# Patient Record
Sex: FEMALE | Race: WHITE | NOT HISPANIC OR LATINO | Employment: OTHER | URBAN - METROPOLITAN AREA
[De-identification: names, ages, dates, MRNs, and addresses within clinical notes are randomized per-mention and may not be internally consistent; named-entity substitution may affect disease eponyms.]

---

## 2023-06-29 ENCOUNTER — HOSPITAL ENCOUNTER (OUTPATIENT)
Dept: DATA CONVERSION | Facility: HOSPITAL | Age: 63
End: 2023-06-29
Attending: INTERNAL MEDICINE | Admitting: INTERNAL MEDICINE
Payer: COMMERCIAL

## 2023-06-29 DIAGNOSIS — K76.6 PORTAL HYPERTENSION (MULTI): ICD-10-CM

## 2023-06-29 DIAGNOSIS — K31.89 OTHER DISEASES OF STOMACH AND DUODENUM: ICD-10-CM

## 2023-06-29 DIAGNOSIS — K29.70 GASTRITIS, UNSPECIFIED, WITHOUT BLEEDING: ICD-10-CM

## 2023-06-29 DIAGNOSIS — K22.70 BARRETT'S ESOPHAGUS WITHOUT DYSPLASIA: ICD-10-CM

## 2023-06-29 DIAGNOSIS — K58.9 IRRITABLE BOWEL SYNDROME WITHOUT DIARRHEA: ICD-10-CM

## 2023-06-29 DIAGNOSIS — K21.9 GASTRO-ESOPHAGEAL REFLUX DISEASE WITHOUT ESOPHAGITIS: ICD-10-CM

## 2023-06-29 DIAGNOSIS — F41.9 ANXIETY DISORDER, UNSPECIFIED: ICD-10-CM

## 2023-06-29 DIAGNOSIS — Z88.0 ALLERGY STATUS TO PENICILLIN: ICD-10-CM

## 2023-06-29 DIAGNOSIS — K31.7 POLYP OF STOMACH AND DUODENUM: ICD-10-CM

## 2023-06-29 DIAGNOSIS — I10 ESSENTIAL (PRIMARY) HYPERTENSION: ICD-10-CM

## 2023-07-07 LAB
COMPLETE PATHOLOGY REPORT: NORMAL
CONVERTED CLINICAL DIAGNOSIS-HISTORY: NORMAL
CONVERTED FINAL DIAGNOSIS: NORMAL
CONVERTED FINAL REPORT PDF LINK TO COPY AND PASTE: NORMAL
CONVERTED GROSS DESCRIPTION: NORMAL

## 2023-07-18 ENCOUNTER — APPOINTMENT (OUTPATIENT)
Dept: PRIMARY CARE | Facility: CLINIC | Age: 63
End: 2023-07-18
Payer: COMMERCIAL

## 2023-09-06 PROBLEM — K22.70 BARRETT ESOPHAGUS: Status: ACTIVE | Noted: 2023-09-06

## 2023-09-06 PROBLEM — B96.89 BACTERIAL VAGINITIS: Status: ACTIVE | Noted: 2023-09-06

## 2023-09-06 PROBLEM — R73.01 IFG (IMPAIRED FASTING GLUCOSE): Status: ACTIVE | Noted: 2023-09-06

## 2023-09-06 PROBLEM — R13.10 DYSPHAGIA: Status: ACTIVE | Noted: 2023-09-06

## 2023-09-06 PROBLEM — N76.0 BACTERIAL VAGINITIS: Status: ACTIVE | Noted: 2023-09-06

## 2023-09-06 PROBLEM — M21.162 GENU VARUM, ACQUIRED, LEFT: Status: ACTIVE | Noted: 2023-09-06

## 2023-09-06 PROBLEM — I10 ESSENTIAL HYPERTENSION: Status: ACTIVE | Noted: 2023-09-06

## 2023-09-06 PROBLEM — R07.9 CHEST PAIN: Status: ACTIVE | Noted: 2023-09-06

## 2023-09-06 PROBLEM — K58.9 IRRITABLE BOWEL SYNDROME: Status: ACTIVE | Noted: 2023-09-06

## 2023-09-06 PROBLEM — K21.9 GASTROESOPHAGEAL REFLUX DISEASE: Status: ACTIVE | Noted: 2023-09-06

## 2023-09-06 RX ORDER — MULTIVITAMIN
TABLET ORAL
COMMUNITY

## 2023-09-06 RX ORDER — LOSARTAN POTASSIUM 100 MG/1
100 TABLET ORAL DAILY
COMMUNITY
End: 2024-01-18 | Stop reason: SDUPTHER

## 2023-09-06 RX ORDER — LORAZEPAM 0.5 MG/1
1 TABLET ORAL DAILY PRN
COMMUNITY
End: 2024-01-18 | Stop reason: SDUPTHER

## 2023-09-06 RX ORDER — HYDROCODONE BITARTRATE AND ACETAMINOPHEN 5; 325 MG/1; MG/1
TABLET ORAL
COMMUNITY
Start: 2021-05-06 | End: 2023-10-30 | Stop reason: ALTCHOICE

## 2023-09-06 RX ORDER — HYDROCHLOROTHIAZIDE 12.5 MG/1
1 TABLET ORAL DAILY PRN
COMMUNITY
Start: 2020-02-13 | End: 2023-11-06 | Stop reason: ALTCHOICE

## 2023-09-06 RX ORDER — DOXYCYCLINE HYCLATE 100 MG
TABLET ORAL
COMMUNITY
Start: 2023-05-27 | End: 2023-10-30 | Stop reason: ALTCHOICE

## 2023-09-06 RX ORDER — MELOXICAM 15 MG/1
1 TABLET ORAL DAILY PRN
COMMUNITY
Start: 2022-08-17 | End: 2024-04-03 | Stop reason: SDUPTHER

## 2023-09-06 RX ORDER — ONDANSETRON 4 MG/1
TABLET, FILM COATED ORAL
COMMUNITY
Start: 2021-05-06 | End: 2023-10-30 | Stop reason: WASHOUT

## 2023-09-06 RX ORDER — PANTOPRAZOLE SODIUM 40 MG/1
TABLET, DELAYED RELEASE ORAL
COMMUNITY
End: 2024-01-18 | Stop reason: SDUPTHER

## 2023-09-29 VITALS — BODY MASS INDEX: 26.57 KG/M2 | HEIGHT: 62 IN | WEIGHT: 144.4 LBS

## 2023-10-26 ENCOUNTER — OFFICE VISIT (OUTPATIENT)
Dept: ORTHOPEDIC SURGERY | Facility: HOSPITAL | Age: 63
End: 2023-10-26
Payer: COMMERCIAL

## 2023-10-26 DIAGNOSIS — M17.12 PRIMARY OSTEOARTHRITIS OF LEFT KNEE: Primary | ICD-10-CM

## 2023-10-26 PROCEDURE — 1036F TOBACCO NON-USER: CPT | Performed by: FAMILY MEDICINE

## 2023-10-26 PROCEDURE — 20611 DRAIN/INJ JOINT/BURSA W/US: CPT | Mod: LT | Performed by: FAMILY MEDICINE

## 2023-10-26 PROCEDURE — 2500000004 HC RX 250 GENERAL PHARMACY W/ HCPCS (ALT 636 FOR OP/ED): Mod: JZ | Performed by: FAMILY MEDICINE

## 2023-10-26 PROCEDURE — 2500000005 HC RX 250 GENERAL PHARMACY W/O HCPCS: Performed by: FAMILY MEDICINE

## 2023-10-26 RX ORDER — LIDOCAINE HYDROCHLORIDE 10 MG/ML
2 INJECTION INFILTRATION; PERINEURAL
Status: COMPLETED | OUTPATIENT
Start: 2023-10-26 | End: 2023-10-26

## 2023-10-26 RX ADMIN — LIDOCAINE HYDROCHLORIDE 2 ML: 10 INJECTION, SOLUTION INFILTRATION; PERINEURAL at 07:59

## 2023-10-26 RX ADMIN — Medication 48 MG: at 07:59

## 2023-10-26 ASSESSMENT — PAIN - FUNCTIONAL ASSESSMENT: PAIN_FUNCTIONAL_ASSESSMENT: 0-10

## 2023-10-26 ASSESSMENT — PAIN SCALES - GENERAL: PAINLEVEL_OUTOF10: 0 - NO PAIN

## 2023-10-26 NOTE — PROGRESS NOTES
** Please excuse any errors in grammar or translation related to this dictation. Voice recognition software was utilized to prepare this document. **    Assessment & Plan:  Synvisc-One injection completed in left knee as below. Discussed with patient that it can take 2-3 weeks for pain relief to occur. This injection can be repeated again in 6 months if providing symptomatic relief. Patient asked to contact clinic in 5 months to start prior authorization process. If has increasing pain prior to then that is not controlled by oral analgesics, can f/u to have CSI completed.  All questions answered and patient is agreeable to this plan.     Chief complaint: left knee pain, Synvisc-One    HPI:  10/26/23: Patient presents for left knee Synvisc-One injection.  Reports she responded well to steroid injection is having minimal symptoms at this time.      8/31/23: 63-year-old female history of left knee arthritis referred by Dr. Kamara for hyaluronic acid injection consult.  Patient reports ongoing left knee pain for a little over 1 year. She reports previous treatment with steroid injection followed by hyaluronic acid injection. The Synvisc-1 injection was completed in October 2022. Reports this gave her approximately 5 to 6 months of pain relief. She recently follow-up with Dr. Kamara to discuss ongoing treatments of this condition. She was informed that due to the underlying arthritis in her knee that she would not be a candidate for arthroscopic surgery. If wanted to pursue surgical options it would be for total joint replacement. She feels that her symptoms are not severe and like to continue nonoperative management at this time.     Exam:  Left knee examined. No effusion, ecchymosis, or erythema. AROM from 0 to 130 deg with 5/5 strength.    Procedure:  Patient ID: Angie Hill is a 63 y.o. female.    L Inj/Asp: L knee on 10/26/2023 7:59 AM  Indications: pain  Details: 21 G needle, ultrasound-guided  Medications: 48  mg hylan 48 mg/6 mL; 2 mL lidocaine 10 mg/mL (1 %)  Outcome: tolerated well, no immediate complications  Procedure, treatment alternatives, risks and benefits explained, specific risks discussed. Consent was given by the patient. Immediately prior to procedure a time out was called to verify the correct patient, procedure, equipment, support staff and site/side marked as required. Patient was prepped and draped in the usual sterile fashion.

## 2023-10-30 ENCOUNTER — OFFICE VISIT (OUTPATIENT)
Dept: PRIMARY CARE | Facility: CLINIC | Age: 63
End: 2023-10-30
Payer: COMMERCIAL

## 2023-10-30 VITALS
HEIGHT: 62 IN | DIASTOLIC BLOOD PRESSURE: 88 MMHG | HEART RATE: 67 BPM | BODY MASS INDEX: 29.22 KG/M2 | SYSTOLIC BLOOD PRESSURE: 122 MMHG | WEIGHT: 158.8 LBS | TEMPERATURE: 96.8 F | OXYGEN SATURATION: 99 %

## 2023-10-30 DIAGNOSIS — I10 ESSENTIAL HYPERTENSION: Primary | ICD-10-CM

## 2023-10-30 DIAGNOSIS — R10.9 LEFT FLANK PAIN: ICD-10-CM

## 2023-10-30 PROCEDURE — 99214 OFFICE O/P EST MOD 30 MIN: CPT | Performed by: FAMILY MEDICINE

## 2023-10-30 PROCEDURE — 1036F TOBACCO NON-USER: CPT | Performed by: FAMILY MEDICINE

## 2023-10-30 PROCEDURE — 3074F SYST BP LT 130 MM HG: CPT | Performed by: FAMILY MEDICINE

## 2023-10-30 PROCEDURE — 3079F DIAST BP 80-89 MM HG: CPT | Performed by: FAMILY MEDICINE

## 2023-10-30 RX ORDER — AMLODIPINE BESYLATE 5 MG/1
5 TABLET ORAL DAILY
Qty: 30 TABLET | Refills: 1 | Status: SHIPPED | OUTPATIENT
Start: 2023-10-30 | End: 2023-11-28 | Stop reason: SDUPTHER

## 2023-10-30 ASSESSMENT — PAIN SCALES - GENERAL: PAINLEVEL: 2

## 2023-10-30 NOTE — ASSESSMENT & PLAN NOTE
- Blood pressure stable in office today though with recent fluctuating blood pressures we will try alternative amlodipine in place of hydrochlorothiazide with plans to monitor ambulatory pressures and contact office in 2 to 3 weeks with readings  -If pressures continue to fluctuate above goal of 140/90 despite use of hydrochlorothiazide and amlodipine, can potentially trial alternative to losartan

## 2023-10-30 NOTE — PATIENT INSTRUCTIONS
Problem List Items Addressed This Visit             ICD-10-CM    Essential hypertension - Primary I10     - Blood pressure stable in office today though with recent fluctuating blood pressures we will try alternative amlodipine in place of hydrochlorothiazide with plans to monitor ambulatory pressures and contact office in 2 to 3 weeks with readings  -If pressures continue to fluctuate above goal of 140/90 despite use of hydrochlorothiazide and amlodipine, can potentially trial alternative to losartan         Relevant Medications    amLODIPine (Norvasc) 5 mg tablet    Other Relevant Orders    CBC    Comprehensive metabolic panel    Lipid panel    Tsh With Reflex To Free T4 If Abnormal    Left flank pain R10.9     - Flank pain complaints seem consistent with probable musculoskeletal strain though we will check a urinalysis along with blood work to assess for any other abnormalities such as kidney dysfunction  -If symptoms persist, can coordinate further evaluation with imaging and/or physical therapy assessment         Relevant Orders    CBC    Comprehensive metabolic panel    Urinalysis with Reflex Microscopic

## 2023-10-30 NOTE — ASSESSMENT & PLAN NOTE
- Flank pain complaints seem consistent with probable musculoskeletal strain though we will check a urinalysis along with blood work to assess for any other abnormalities such as kidney dysfunction  -If symptoms persist, can coordinate further evaluation with imaging and/or physical therapy assessment

## 2023-10-30 NOTE — PROGRESS NOTES
Outpatient Visit Note    Chief Complaint   Patient presents with    Hypertension     Pt states she does not feel hydrochlorothiazide is helping. Pt is having numbers such as 150/100 and 140/84. Pt states diastolic has not gone below 84 since she started the medication.      Pt took BP at home around 8am which was 134/93. Pt states that after about 4 hours after taking initial BP is the best but then goes back up for the rest of the day.         HPI:  Angie Hill is a 63 y.o. female a past medical history significant for hypertension, GERD, anxiety and bilateral knee pain who presents to the office secondary to concerns of elevated blood pressure.  She was last seen in the office on 7/13/2023 as a new patient to establish care and for blood pressure follow-up.    Had been previously established with primary care providers at [facility], in which she was in the process of moving to the area at last encounter.  Had been last seen by her former PCP for routine follow-up in February at which time blood work was completed including CBC, CMP lipid panel. Blood work was remarkable for hyperglycemia and mildly elevated LDL. Beyond her primary care provider, she does have established relationship with GI to whom she last saw in May for repeat endoscopy. States the GERD has been stable on pantoprazole. She also has seen orthopedics for her knee pain.    Hypertension:  Has historically manage her high blood pressure with losartan 100 mg daily which has been well-tolerated without adverse side effects.  At last encounter she noted that her blood pressure has recently been elevated over the prior several days with systolics in the 170s and diastolics in the 100s. Denied any chest pain, shortness a breath, lightheadedness or dizziness.  Admitted to recent stressors related to moving. Had been previously prescribed hydrochlorothiazide 12.5 mg, though she stated that this was never routinely, using it only as  needed.  Blood pressure was elevated in office at 144/90 to which she was encouraged to routinely take hydrochlorothiazide along with losartan regimen.     Today she reports compliance with losartan hydrochlorothiazide regimen though she continues to note alternating pressures.  States that her pressures in the morning are typically in the 140s/90s-100s.  After she takes her medication her pressures normally stabilize in the middle of the day though this wears off in the evenings.  Denies any adverse side effects from hydrochlorothiazide.  No reported chest pain, shortness of breath, lightheadedness or dizziness.    In regards to her stress level, previously expressed to have been under significant stress following her recent move. Traditionally managed her anxiety with infrequent as needed lorazepam.  Reports ongoing stressors with her home set to be completed in November. Denies recent anxiety attacks or other symptomatic complaints such as major depressive episodes, anhedonia, SI/HI.    Lastly, she has had persistent left flank pain for the last several months.  Does have a history of sciatica to which she has been actively working with chiropractic medicine.  Was able to help with low back/sciatic issues though flank discomfort has continued.  Denies any urinary changes, hematuria, urgency or frequency.  Current Medications  Current Outpatient Medications   Medication Instructions    hydroCHLOROthiazide (HYDRODiuril) 12.5 mg tablet 1 tablet, oral, Daily PRN    LORazepam (Ativan) 0.5 mg tablet 1 tablet, oral, Daily PRN    losartan (COZAAR) 100 mg, oral, Daily    meloxicam (Mobic) 15 mg tablet 1 tablet, oral, Daily PRN    multivitamin tablet TAKE 1 TABLET DAILY.           OTC    pantoprazole (ProtoNix) 40 mg EC tablet Take one tablet once daily 30minutes - 1 hr prior to meal.        Allergies  Allergies   Allergen Reactions    Tetracyclines GI Upset    Penicillins Rash        Past Medical History:   Diagnosis Date     Anxiety     GERD (gastroesophageal reflux disease)     Hypertension     Personal history of other mental and behavioral disorders 2022    History of anxiety      Past Surgical History:   Procedure Laterality Date     SECTION, CLASSIC  2016     Section    LAPAROSCOPY DIAGNOSTIC / BIOPSY / ASPIRATION / LYSIS  2016    Exploratory Laparoscopy    OTHER SURGICAL HISTORY  10/12/2021    Knee surgery     Family History   Problem Relation Name Age of Onset    Heart disease Mother      Hypertension Mother      Lung disease Father      Ovarian cancer Sister       Social History     Tobacco Use    Smoking status: Never    Smokeless tobacco: Never   Vaping Use    Vaping Use: Never used   Substance Use Topics    Alcohol use: Never    Drug use: Never       ROS  All pertinent positive symptoms are included in the history of present illness.  All other systems have been reviewed and are negative and noncontributory to this patient's current ailments.    VITAL SIGNS  Vitals:    10/30/23 1126   BP: 122/88   Pulse: 67   Temp: 36 °C (96.8 °F)   SpO2: 99%       PHYSICAL EXAM  GENERAL APPEARANCE: alert and oriented, Pleasant and cooperative, No Acute Distress  HEENT: EOMI, PERRLA, MMM  HEART: RRR, normal S1S2, no murmurs, click or rubs  LUNGS: clear to auscultation bilaterally, no wheezes/rhonchi/rales  EXTREMITIES: no edema, normal ROM  SKIN: normal, no rash, unremarkable  NEUROLOGIC EXAM: non-focal exam  MUSCULOSKELETAL: no gross abnormalities  PSYCH: affect is normal, eye contact is good    Assessment/Plan   Problem List Items Addressed This Visit             ICD-10-CM    Essential hypertension - Primary I10     - Blood pressure stable in office today though with recent fluctuating blood pressures we will try alternative amlodipine in place of hydrochlorothiazide with plans to monitor ambulatory pressures and contact office in 2 to 3 weeks with readings  -If pressures continue to fluctuate above goal  of 140/90 despite use of hydrochlorothiazide and amlodipine, can potentially trial alternative to losartan         Relevant Medications    amLODIPine (Norvasc) 5 mg tablet    Other Relevant Orders    CBC    Comprehensive metabolic panel    Lipid panel    Tsh With Reflex To Free T4 If Abnormal    Left flank pain R10.9     - Flank pain complaints seem consistent with probable musculoskeletal strain though we will check a urinalysis along with blood work to assess for any other abnormalities such as kidney dysfunction  -If symptoms persist, can coordinate further evaluation with imaging and/or physical therapy assessment         Relevant Orders    CBC    Comprehensive metabolic panel    Urinalysis with Reflex Microscopic

## 2023-11-06 ENCOUNTER — LAB (OUTPATIENT)
Dept: LAB | Facility: LAB | Age: 63
End: 2023-11-06
Payer: COMMERCIAL

## 2023-11-06 ENCOUNTER — OFFICE VISIT (OUTPATIENT)
Dept: OBSTETRICS AND GYNECOLOGY | Facility: CLINIC | Age: 63
End: 2023-11-06
Payer: COMMERCIAL

## 2023-11-06 VITALS — SYSTOLIC BLOOD PRESSURE: 124 MMHG | DIASTOLIC BLOOD PRESSURE: 90 MMHG

## 2023-11-06 DIAGNOSIS — Z11.51 SCREENING FOR HUMAN PAPILLOMAVIRUS (HPV): ICD-10-CM

## 2023-11-06 DIAGNOSIS — Z12.4 SCREENING FOR CERVICAL CANCER: ICD-10-CM

## 2023-11-06 DIAGNOSIS — Z12.31 SCREENING MAMMOGRAM FOR BREAST CANCER: ICD-10-CM

## 2023-11-06 DIAGNOSIS — Z01.419 ENCOUNTER FOR WELL WOMAN EXAM WITH ROUTINE GYNECOLOGICAL EXAM: ICD-10-CM

## 2023-11-06 DIAGNOSIS — Z01.419 ENCOUNTER FOR WELL WOMAN EXAM WITH ROUTINE GYNECOLOGICAL EXAM: Primary | ICD-10-CM

## 2023-11-06 PROCEDURE — 88175 CYTOPATH C/V AUTO FLUID REDO: CPT

## 2023-11-06 PROCEDURE — 3080F DIAST BP >= 90 MM HG: CPT | Performed by: NURSE PRACTITIONER

## 2023-11-06 PROCEDURE — 99396 PREV VISIT EST AGE 40-64: CPT | Performed by: NURSE PRACTITIONER

## 2023-11-06 PROCEDURE — 87624 HPV HI-RISK TYP POOLED RSLT: CPT

## 2023-11-06 PROCEDURE — 1036F TOBACCO NON-USER: CPT | Performed by: NURSE PRACTITIONER

## 2023-11-06 PROCEDURE — 86304 IMMUNOASSAY TUMOR CA 125: CPT

## 2023-11-06 PROCEDURE — 36415 COLL VENOUS BLD VENIPUNCTURE: CPT

## 2023-11-06 PROCEDURE — 3074F SYST BP LT 130 MM HG: CPT | Performed by: NURSE PRACTITIONER

## 2023-11-06 NOTE — PROGRESS NOTES
HPI:   Angie Hill is a 63 y.o. who presents today for her annual gynecologic exam without complaints    She has the following concerns; None.     GYN HISTORY:  Denies any postmenopausal bleeding.     PAP History   Last pap:   2019 Normal HPV Negative  History of abnormal pap: no    Health Screening  Family history of breast, uterine, ovarian or colon cancer: yes - her sister has a hx of ovarian cancer.     Breast cancer: mammogram - needs an order. Desires breast MRI; hx of dense breasts.   Last mammogram: 2023    Colon cancer: per PCP        The patient feels safe at home.         Review of Systems:   Constitutional: no fever and no chills.  Cardiovascular: no chest pain.   Respiratory: no shortness of breath.   Gastrointestinal: no nausea, no abdominal pain and no constipation  Genitourinary: no dysuria, no urinary incontinence, no vaginal dryness, no pelvic pain and no vaginal discharge.   Neurological: no headache.  Psychiatric: no anxiety and no depression.              Objective         /90         Physical Exam:   Constitutional: Alert and in no acute distress. Well developed, well nourished.      Neck: No neck asymmetry. Supple. Thyroid not enlarged and there were no palpable thyroid nodules.      Cardiovascular: Heart rate and rhythm were normal, normal S1 and S2, no gallops, and no murmurs.      Pulmonary: No respiratory distress. Clear bilateral breath sounds.      Chest: Breasts: Normal appearance, no nipple discharge and no skin changes. Palpation of breasts and axillae: No palpable mass and no axillary lymphadenopathy.      Abdomen: Soft nontender; no abdominal mass palpated. Normal bowel sounds. No organomegaly.      Genitourinary:   - External genitalia: Normal.   - Palpation of lymph nodes in groin: No inguinal lymphadenopathy.   - Bartholin's Urethral and Skenes Glands: Normal.   - Urethra: Normal.    -Bladder: Normal on palpation.   - Vagina: Normal.   - Cervix: Normal.   -  Uterus: Normal. Right Adnexa/parametria: Normal. Left Adnexa/parametria: Normal.   - Perianal Area: Normal.      Skin: Normal skin color and pigmentation, normal skin turgor, and no rash     Psychiatric: Alert and oriented x 3. Affect normal to patient baseline. Mood: Appropriate.            Assessment/Plan       Diagnoses and all orders for this visit:  Encounter for well woman exam with routine gynecological exam  Here for well woman exam. She is doing well. 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 She desires Ca-125 to be drawn today. She denies any epigastric pain, no postmenopausal bleeding or abnormal vaginal discharge.   -     Cancer Antigen 125; Future  Screening mammogram for breast cancer  -     BI mammo bilateral screening tomosynthesis; Future  -     MR breast bilateral w IV contrast fast screening self pay; Future  Screening for cervical cancer  -     THINPREP PAP  Screening for human papillomavirus (HPV)  -     THINPREP PAP    Follow-up in one year; sooner if needed. Will plan for possible pelvic ultrasound next year and bone density scan starting at age 64.         KEYUR William-CNP

## 2023-11-07 LAB — CANCER AG125 SERPL-ACNC: 13.1 U/ML (ref 0–30.2)

## 2023-11-28 ENCOUNTER — OFFICE VISIT (OUTPATIENT)
Dept: PRIMARY CARE | Facility: CLINIC | Age: 63
End: 2023-11-28
Payer: COMMERCIAL

## 2023-11-28 VITALS
SYSTOLIC BLOOD PRESSURE: 140 MMHG | DIASTOLIC BLOOD PRESSURE: 80 MMHG | HEIGHT: 62 IN | TEMPERATURE: 96.6 F | HEART RATE: 82 BPM | BODY MASS INDEX: 29.52 KG/M2 | OXYGEN SATURATION: 97 % | WEIGHT: 160.4 LBS

## 2023-11-28 DIAGNOSIS — I10 ESSENTIAL HYPERTENSION: ICD-10-CM

## 2023-11-28 DIAGNOSIS — J01.40 ACUTE NON-RECURRENT PANSINUSITIS: Primary | ICD-10-CM

## 2023-11-28 PROCEDURE — 3077F SYST BP >= 140 MM HG: CPT | Performed by: FAMILY MEDICINE

## 2023-11-28 PROCEDURE — 1036F TOBACCO NON-USER: CPT | Performed by: FAMILY MEDICINE

## 2023-11-28 PROCEDURE — 99214 OFFICE O/P EST MOD 30 MIN: CPT | Performed by: FAMILY MEDICINE

## 2023-11-28 PROCEDURE — 3079F DIAST BP 80-89 MM HG: CPT | Performed by: FAMILY MEDICINE

## 2023-11-28 RX ORDER — AZITHROMYCIN 250 MG/1
TABLET, FILM COATED ORAL
Qty: 6 TABLET | Refills: 0 | Status: SHIPPED | OUTPATIENT
Start: 2023-11-28 | End: 2023-12-03

## 2023-11-28 RX ORDER — AMLODIPINE BESYLATE 5 MG/1
5 TABLET ORAL DAILY
Qty: 30 TABLET | Refills: 0 | Status: SHIPPED | OUTPATIENT
Start: 2023-11-28 | End: 2024-01-18 | Stop reason: SDUPTHER

## 2023-11-28 RX ORDER — FLUTICASONE PROPIONATE 50 MCG
1 SPRAY, SUSPENSION (ML) NASAL DAILY
Qty: 16 G | Refills: 1 | Status: SHIPPED | OUTPATIENT
Start: 2023-11-28 | End: 2024-01-18 | Stop reason: ALTCHOICE

## 2023-11-28 ASSESSMENT — PATIENT HEALTH QUESTIONNAIRE - PHQ9
SUM OF ALL RESPONSES TO PHQ9 QUESTIONS 1 & 2: 0
1. LITTLE INTEREST OR PLEASURE IN DOING THINGS: NOT AT ALL
2. FEELING DOWN, DEPRESSED OR HOPELESS: NOT AT ALL

## 2023-11-28 ASSESSMENT — LIFESTYLE VARIABLES
AUDIT-C TOTAL SCORE: 1
HOW OFTEN DO YOU HAVE A DRINK CONTAINING ALCOHOL: MONTHLY OR LESS
HOW MANY STANDARD DRINKS CONTAINING ALCOHOL DO YOU HAVE ON A TYPICAL DAY: PATIENT DOES NOT DRINK
HOW OFTEN DO YOU HAVE SIX OR MORE DRINKS ON ONE OCCASION: NEVER
SKIP TO QUESTIONS 9-10: 1

## 2023-11-28 ASSESSMENT — ENCOUNTER SYMPTOMS
OCCASIONAL FEELINGS OF UNSTEADINESS: 0
DEPRESSION: 0
LOSS OF SENSATION IN FEET: 0

## 2023-11-28 ASSESSMENT — PAIN SCALES - GENERAL: PAINLEVEL: 8

## 2023-11-28 NOTE — ASSESSMENT & PLAN NOTE
- Given your symptoms and duration of illness, we feel that you can benefit from antibiotic coverage at this time   - A prescription for azithromycin was sent to your pharmacy, please take this medication as prescribed  - Flonase additionally encouraged secondary to sinus pressure complaints   - Recommend supportive care with increased fluid intake to thin secretions, Ibuprofen or Tylenol as needed for pain or fever, and steamy showers/saline nasal rinses to help clear the nasal passages   - You may consider tea with honey or a cinnamon stick as these have natural antiviral and antibiotic properties   - Call if symptoms worsen or do not improve with these treatments

## 2023-11-28 NOTE — ASSESSMENT & PLAN NOTE
- Blood pressures have been steadily improving on current amlodipine regimen though pressure slightly elevated today, likely secondary to acute infection  -We will continue on current regimen with follow-up plans in January as scheduled; prescription refill sent to cover until follow-up

## 2023-11-28 NOTE — PATIENT INSTRUCTIONS
Problem List Items Addressed This Visit             ICD-10-CM    Essential hypertension I10     - Blood pressures have been steadily improving on current amlodipine regimen though pressure slightly elevated today, likely secondary to acute infection  -We will continue on current regimen with follow-up plans in January as scheduled; prescription refill sent to cover until follow-up         Relevant Medications    amLODIPine (Norvasc) 5 mg tablet    Acute non-recurrent pansinusitis - Primary J01.40     - Given your symptoms and duration of illness, we feel that you can benefit from antibiotic coverage at this time   - A prescription for azithromycin was sent to your pharmacy, please take this medication as prescribed  - Flonase additionally encouraged secondary to sinus pressure complaints   - Recommend supportive care with increased fluid intake to thin secretions, Ibuprofen or Tylenol as needed for pain or fever, and steamy showers/saline nasal rinses to help clear the nasal passages   - You may consider tea with honey or a cinnamon stick as these have natural antiviral and antibiotic properties   - Call if symptoms worsen or do not improve with these treatments         Relevant Medications    azithromycin (Zithromax) 250 mg tablet    fluticasone (Flonase) 50 mcg/actuation nasal spray

## 2023-11-28 NOTE — PROGRESS NOTES
Outpatient Visit Note    Chief Complaint   Patient presents with    Sinusitis     Sinus infection/pressure x9 days. Took OTC COVID test with neg result. Brown colored drainage     Laryngitis    Earache     Having trouble hearing    Cough    Headache         HPI:  Angie Hill is a 63 y.o. female with a past medical history significant for hypertension, GERD, anxiety and bilateral knee pain who presents to the office secondary to multiple upper respiratory complaints including sinus pressure, laryngitis and ear pain.  She was last seen in the office on 10/30/2023 secondary to concerns of elevated blood pressure.  At time of last appointment, plan was set for patient to try amlodipine in place of hydrochlorothiazide secondary to limited response with fluctuating blood pressures.    She reports approximately 9 days of persistent and progressive sinus pressure with associated postnasal drainage, cough and headache.  Was attempting to manage symptoms with supportive care measures including Tylenol, DayQuil and saline nasal spray without significant improvement.  Developed moderate hoarseness over the last 48 hours with bilateral ear fullness/muffled hearing in the last 24 hours.  Has noted low-grade fevers though did take Tylenol today with normal vitals.    Hypertension:  Has been faithful with losartan and amlodipine regimen since last visit denying any adverse side effects.  After taking medication for 1 week, she did note improvement in her blood pressures with pressures typically 125-135 systolic and mid 80s diastolic.    Current Medications  Current Outpatient Medications   Medication Instructions    amLODIPine (NORVASC) 5 mg, oral, Daily    azithromycin (Zithromax) 250 mg tablet Take 2 tablets (500 mg) by mouth once daily for 1 day, THEN 1 tablet (250 mg) once daily for 4 days. Take 2 tabs (500 mg) by mouth today, than 1 daily for 4 days..    fluticasone (Flonase) 50 mcg/actuation nasal spray 1 spray,  Each Nostril, Daily, Shake gently. Before first use, prime pump. After use, clean tip and replace cap.    LORazepam (Ativan) 0.5 mg tablet 1 tablet, oral, Daily PRN    losartan (COZAAR) 100 mg, oral, Daily    meloxicam (Mobic) 15 mg tablet 1 tablet, oral, Daily PRN    multivitamin tablet TAKE 1 TABLET DAILY.           OTC    pantoprazole (ProtoNix) 40 mg EC tablet Take one tablet once daily 30minutes - 1 hr prior to meal.        Allergies  Allergies   Allergen Reactions    Tetracyclines GI Upset    Penicillins Rash        Past Medical History:   Diagnosis Date    Anxiety     GERD (gastroesophageal reflux disease)     Hypertension     Personal history of other mental and behavioral disorders 2022    History of anxiety      Past Surgical History:   Procedure Laterality Date     SECTION, CLASSIC  2016     Section    LAPAROSCOPY DIAGNOSTIC / BIOPSY / ASPIRATION / LYSIS  2016    Exploratory Laparoscopy    OTHER SURGICAL HISTORY  10/12/2021    Knee surgery     Family History   Problem Relation Name Age of Onset    Heart disease Mother      Hypertension Mother      Lung disease Father      Ovarian cancer Sister       Social History     Tobacco Use    Smoking status: Never    Smokeless tobacco: Never   Vaping Use    Vaping Use: Never used   Substance Use Topics    Alcohol use: Never    Drug use: Never       ROS  All pertinent positive symptoms are included in the history of present illness.  All other systems have been reviewed and are negative and noncontributory to this patient's current ailments.    VITAL SIGNS  Vitals:    23 1633   BP: 140/80   Pulse: 82   Temp: 35.9 °C (96.6 °F)   SpO2: 97%       PHYSICAL EXAM  GENERAL APPEARANCE: alert and oriented, Pleasant and cooperative, No Acute Distress  HEENT: EOMI, PERRLA, tympanic membranes retracted bilaterally with visible fluid, nose clear, Oropharynx mildly erythematous, MMM  NECK: Slight bilateral anterior cervical  lymphadenopathy  HEART: RRR, normal S1S2, no murmurs, click or rubs  LUNGS: clear to auscultation bilaterally, no wheezes/rhonchi/rales  EXTREMITIES: no edema, normal ROM  SKIN: normal, no rash, unremarkable  NEUROLOGIC EXAM: non-focal exam  MUSCULOSKELETAL: no gross abnormalities  PSYCH: affect is normal, eye contact is good      Assessment/Plan   Problem List Items Addressed This Visit             ICD-10-CM    Essential hypertension I10     - Blood pressures have been steadily improving on current amlodipine regimen though pressure slightly elevated today, likely secondary to acute infection  -We will continue on current regimen with follow-up plans in January as scheduled; prescription refill sent to cover until follow-up         Relevant Medications    amLODIPine (Norvasc) 5 mg tablet    Acute non-recurrent pansinusitis - Primary J01.40     - Given your symptoms and duration of illness, we feel that you can benefit from antibiotic coverage at this time   - A prescription for azithromycin was sent to your pharmacy, please take this medication as prescribed  - Flonase additionally encouraged secondary to sinus pressure complaints   - Recommend supportive care with increased fluid intake to thin secretions, Ibuprofen or Tylenol as needed for pain or fever, and steamy showers/saline nasal rinses to help clear the nasal passages   - You may consider tea with honey or a cinnamon stick as these have natural antiviral and antibiotic properties   - Call if symptoms worsen or do not improve with these treatments         Relevant Medications    azithromycin (Zithromax) 250 mg tablet    fluticasone (Flonase) 50 mcg/actuation nasal spray

## 2023-12-20 ENCOUNTER — OFFICE VISIT (OUTPATIENT)
Dept: DERMATOLOGY | Facility: CLINIC | Age: 63
End: 2023-12-20
Payer: COMMERCIAL

## 2023-12-20 DIAGNOSIS — Z80.8 FAMILY HISTORY OF SKIN CANCER: ICD-10-CM

## 2023-12-20 DIAGNOSIS — D18.01 HEMANGIOMA OF SKIN: ICD-10-CM

## 2023-12-20 DIAGNOSIS — L57.8 PHOTOAGING OF SKIN: ICD-10-CM

## 2023-12-20 DIAGNOSIS — L81.4 LENTIGO: ICD-10-CM

## 2023-12-20 DIAGNOSIS — Z12.83 ENCOUNTER FOR SCREENING FOR MALIGNANT NEOPLASM OF SKIN: Primary | ICD-10-CM

## 2023-12-20 DIAGNOSIS — L57.0 ACTINIC KERATOSIS: ICD-10-CM

## 2023-12-20 DIAGNOSIS — D48.5 NEOPLASM OF UNCERTAIN BEHAVIOR OF SKIN: ICD-10-CM

## 2023-12-20 DIAGNOSIS — L85.3 XEROSIS CUTIS: ICD-10-CM

## 2023-12-20 PROCEDURE — 88305 TISSUE EXAM BY PATHOLOGIST: CPT | Performed by: DERMATOLOGY

## 2023-12-20 PROCEDURE — 17000 DESTRUCT PREMALG LESION: CPT | Performed by: DERMATOLOGY

## 2023-12-20 PROCEDURE — 11103 TANGNTL BX SKIN EA SEP/ADDL: CPT | Performed by: DERMATOLOGY

## 2023-12-20 PROCEDURE — 88305 TISSUE EXAM BY PATHOLOGIST: CPT | Mod: TC,DER | Performed by: DERMATOLOGY

## 2023-12-20 PROCEDURE — 11102 TANGNTL BX SKIN SINGLE LES: CPT | Performed by: DERMATOLOGY

## 2023-12-20 PROCEDURE — 88341 IMHCHEM/IMCYTCHM EA ADD ANTB: CPT | Performed by: DERMATOLOGY

## 2023-12-20 PROCEDURE — 17003 DESTRUCT PREMALG LES 2-14: CPT | Performed by: DERMATOLOGY

## 2023-12-20 PROCEDURE — 1036F TOBACCO NON-USER: CPT | Performed by: DERMATOLOGY

## 2023-12-20 PROCEDURE — 99203 OFFICE O/P NEW LOW 30 MIN: CPT | Performed by: DERMATOLOGY

## 2023-12-20 PROCEDURE — 88341 IMHCHEM/IMCYTCHM EA ADD ANTB: CPT | Mod: TC,DER | Performed by: DERMATOLOGY

## 2023-12-20 PROCEDURE — 88342 IMHCHEM/IMCYTCHM 1ST ANTB: CPT | Performed by: DERMATOLOGY

## 2023-12-20 ASSESSMENT — DERMATOLOGY PATIENT ASSESSMENT
ARE YOU TRYING TO GET PREGNANT: NO
ARE YOU AN ORGAN TRANSPLANT RECIPIENT: NO
HAVE YOU HAD OR DO YOU HAVE VASCULAR DISEASE: NO
HAVE YOU HAD OR DO YOU HAVE A STAPH INFECTION: NO
DO YOU USE SUNSCREEN: OCCASIONALLY
DO YOU USE A TANNING BED: NO
DO YOU HAVE IRREGULAR MENSTRUAL CYCLES: NO
ARE YOU ON BIRTH CONTROL: NO
DO YOU HAVE ANY NEW OR CHANGING LESIONS: YES

## 2023-12-20 ASSESSMENT — DERMATOLOGY QUALITY OF LIFE (QOL) ASSESSMENT
RATE HOW EMOTIONALLY BOTHERED YOU ARE BY YOUR SKIN PROBLEM (FOR EXAMPLE, WORRY, EMBARRASSMENT, FRUSTRATION): 0 - NEVER BOTHERED
DATE THE QUALITY-OF-LIFE ASSESSMENT WAS COMPLETED: 66828
RATE HOW BOTHERED YOU ARE BY SYMPTOMS OF YOUR SKIN PROBLEM (EG, ITCHING, STINGING BURNING, HURTING OR SKIN IRRITATION): 0 - NEVER BOTHERED
ARE THERE EXCLUSIONS OR EXCEPTIONS FOR THE QUALITY OF LIFE ASSESSMENT: NO
WHAT SINGLE SKIN CONDITION LISTED BELOW IS THE PATIENT ANSWERING THE QUALITY-OF-LIFE ASSESSMENT QUESTIONS ABOUT: NONE OF THE ABOVE
RATE HOW BOTHERED YOU ARE BY EFFECTS OF YOUR SKIN PROBLEMS ON YOUR ACTIVITIES (EG, GOING OUT, ACCOMPLISHING WHAT YOU WANT, WORK ACTIVITIES OR YOUR RELATIONSHIPS WITH OTHERS): 0 - NEVER BOTHERED

## 2023-12-20 ASSESSMENT — ITCH NUMERIC RATING SCALE: HOW SEVERE IS YOUR ITCHING?: 0

## 2023-12-20 ASSESSMENT — PATIENT GLOBAL ASSESSMENT (PGA): PATIENT GLOBAL ASSESSMENT: PATIENT GLOBAL ASSESSMENT:  1 - CLEAR

## 2023-12-20 NOTE — PROGRESS NOTES
Subjective     Angie Hill is a 63 y.o. female who presents for the following: Skin Check (Family Hx of SCC - Parents. Areas of concern, Right Shoulder, Bilateral Arms. ).     Review of Systems:  No other skin or systemic complaints other than what is documented elsewhere in the note.    The following portions of the chart were reviewed this encounter and updated as appropriate:         Skin Cancer History  No skin cancer on file.      Specialty Problems          Dermatology Problems    Other nail disorders        Objective   Well appearing patient in no apparent distress; mood and affect are within normal limits.    A full examination was performed including scalp, head, eyes, ears, nose, lips, neck, chest, axillae, abdomen, back, buttocks, bilateral upper extremities, bilateral lower extremities, hands, feet, fingers, toes, fingernails, and toenails. All findings within normal limits unless otherwise noted below.    Assessment/Plan   1. Encounter for screening for malignant neoplasm of skin  No suspicious lesions noted on examination today    The risk of chronic, cumulative sun damage and risk of development of skin cancer was reviewed today.   The importance of sun protection was reviewed: including the use of a broad spectrum sunscreen that protects against both UVA/UVB rays, with ingredients such as Zinc oxide or titanium dioxide, wearing sun protective clothing and sun avoidance. We reviewed the warning signs of non-melanoma skin cancer and ABCDEs of melanoma  Please follow up should you notice any new or changing pre-existing skin lesion.    Related Procedures  Follow Up In Dermatology - Established Patient    2. Neoplasm of uncertain behavior of skin (2)  Right Upper Back  Erythematous scaly papule          Lesion biopsy  Type of biopsy: tangential    Informed consent: discussed and consent obtained    Timeout: patient name, date of birth, surgical site, and procedure verified    Procedure prep:   Patient was prepped and draped  Anesthesia: the lesion was anesthetized in a standard fashion    Anesthetic:  1% lidocaine w/ epinephrine 1-100,000 local infiltration  Instrument used: DermaBlade    Hemostasis achieved with: aluminum chloride    Outcome: patient tolerated procedure well    Post-procedure details: sterile dressing applied and wound care instructions given    Dressing type: petrolatum and bandage      Staff Communication: Dermatology Local Anesthesia: 1 % Lidocaine / Epinephrine - Amount:1cc    Specimen 1 - Dermatopathology- DERM LAB  Differential Diagnosis: BCC  Check Margins Yes/No?:    Comments:    Dermpath Lab: Routine Histopathology (formalin-fixed tissue)    Right superior shoulder  Brown asymmetric 4mm macule          Lesion biopsy  Type of biopsy: tangential    Informed consent: discussed and consent obtained    Timeout: patient name, date of birth, surgical site, and procedure verified    Procedure prep:  Patient was prepped and draped  Anesthesia: the lesion was anesthetized in a standard fashion    Anesthetic:  1% lidocaine w/ epinephrine 1-100,000 local infiltration  Instrument used: DermaBlade    Hemostasis achieved with: aluminum chloride    Outcome: patient tolerated procedure well    Post-procedure details: sterile dressing applied and wound care instructions given    Dressing type: petrolatum and bandage      Staff Communication: Dermatology Local Anesthesia: 1 % Lidocaine / Epinephrine - Amount:1cc    Specimen 2 - Dermatopathology- DERM LAB  Differential Diagnosis: atypical nevus vs. lentigo  Check Margins Yes/No?:    Comments:    Dermpath Lab: Routine Histopathology (formalin-fixed tissue)    Lesion concerning for bcc (1), atypical nevus (2). The need for biopsy for definitive diagnosis recommended. Risks and benefits reviewed, see procedure note.    3. Actinic keratosis (2)  Left Forearm - Posterior, Right Forearm - Posterior  Erythematous macules with gritty scale.    Lesions are  due to cumulative sun damage over time and are pre-cancerous. They have a risk (although small in majority of patients) of developing into squamous cell carcinoma and therefore treatment recommendations were offered and discussed.   Treatments Discussed include LN2, photodynamic (blue light) therapy & topical chemotherapy creams, risks and benefits of each.     The risks and benefits of LN2 were reviewed including incomplete removal, crusting, blister hypo and/or hyperpigmentation, scarring and recurrence. Pt elected for LN2 today    Destr of lesion - Left Forearm - Posterior, Right Forearm - Posterior  Complexity: simple    Destruction method: cryotherapy    Informed consent: discussed and consent obtained    Lesion destroyed using liquid nitrogen: Yes    Cryotherapy cycles:  1  Outcome: patient tolerated procedure well with no complications    Post-procedure details: wound care instructions given      4. Photoaging of skin  Mottled pigmentation with telangiectasias and brown reticular macules in sun exposed areas of the body.    The risk of chronic, cumulative sun damage and risk of development of skin cancer was reviewed today.   The importance of sun protection was reviewed: including the use of a broad spectrum sunscreen that protects against both UVA/UVB rays, with ingredients such as Zinc oxide or titanium dioxide, wearing sun protective clothing and sun avoidance. We reviewed the warning signs of non-melanoma skin cancer and ABCDEs of melanoma  Please follow up should you notice any new or changing pre-existing skin lesion.    Related Procedures  Follow Up In Dermatology - Established Patient    5. Family history of skin cancer  No suspicious lesions noted on examination today    The risk of chronic, cumulative sun damage and risk of development of skin cancer was reviewed today.   The importance of sun protection was reviewed: including the use of a broad spectrum sunscreen that protects against both  UVA/UVB rays, with ingredients such as Zinc oxide or titanium dioxide, wearing sun protective clothing and sun avoidance. We reviewed the warning signs of non-melanoma skin cancer and ABCDEs of melanoma  Please follow up should you notice any new or changing pre-existing skin lesion.    6. Xerosis cutis  Fine, ashy, pale to white scale diffusely over skin.    Dry skin is common, often worse during the dry months of the year and may also worsen as we age.  A gentle skin care regimen includes:  -washing with a mild soap without fragrance such as Dove for sensitive skin, Cetaphil or Cerave.  -pat dry after washing and then apply a thick moisturizer such as Cerave cream or Cetaphil cream. Creams are thicker than lotions and often do a better job of restoring the skin barrier.      7. Hemangioma of skin  Cherry red papules    The benign nature of these skin lesions were reviewed, no treatment is necessary.   Please follow up for any new or pre-existing lesion that is changing in size, shape, color, becomes painful, tender, itches or bleed.    8. Lentigo  Scattered tan macules in sun-exposed areas.    These are benign skin lesions due to sun exposure. They will darken in response to sun exposure. They should be monitored for change in size, shape or color.  These lesions can be treated cosmetically with topical creams, liquid nitrogen and a variety of lasers.      Follow up in 1 year or sooner pending biopsy results.

## 2023-12-26 LAB
LAB AP ASR DISCLAIMER: NORMAL
LABORATORY COMMENT REPORT: NORMAL
PATH REPORT.FINAL DX SPEC: NORMAL
PATH REPORT.GROSS SPEC: NORMAL
PATH REPORT.MICROSCOPIC SPEC OTHER STN: NORMAL
PATH REPORT.RELEVANT HX SPEC: NORMAL
PATH REPORT.TOTAL CANCER: NORMAL

## 2023-12-28 NOTE — RESULT ENCOUNTER NOTE
A) Superficial BCC - ED&C vs. Excision  B) Atypical compound melanocytic neoplasm concerning for melanoma 0.1mm Breslow - Excision with 1 cm margin    Left message to return call

## 2023-12-29 ENCOUNTER — TELEPHONE (OUTPATIENT)
Dept: DERMATOLOGY | Facility: CLINIC | Age: 63
End: 2023-12-29
Payer: COMMERCIAL

## 2023-12-29 DIAGNOSIS — D03.61 MELANOMA IN SITU OF RIGHT UPPER EXTREMITY (MULTI): Primary | ICD-10-CM

## 2023-12-29 DIAGNOSIS — C43.61 MALIGNANT MELANOMA OF RIGHT UPPER LIMB, INCLUDING SHOULDER (MULTI): ICD-10-CM

## 2023-12-29 NOTE — RESULT ENCOUNTER NOTE
Pt returned call and left message.  I returned call. Pt aware  A) BCC - Elected for ED&C  B) ATN concerning for melanoma - excision with 1cm margin  Both procedures can be done in 1 MSO slot - pt needs scheduled. I have sent PA

## 2024-01-04 ENCOUNTER — APPOINTMENT (OUTPATIENT)
Dept: RADIOLOGY | Facility: CLINIC | Age: 64
End: 2024-01-04
Payer: COMMERCIAL

## 2024-01-04 DIAGNOSIS — I10 ESSENTIAL (PRIMARY) HYPERTENSION: ICD-10-CM

## 2024-01-08 RX ORDER — HYDROCHLOROTHIAZIDE 12.5 MG/1
12.5 TABLET ORAL DAILY PRN
Qty: 90 TABLET | Refills: 1 | Status: SHIPPED | OUTPATIENT
Start: 2024-01-08 | End: 2024-01-18 | Stop reason: ALTCHOICE

## 2024-01-11 ENCOUNTER — LAB (OUTPATIENT)
Dept: LAB | Facility: LAB | Age: 64
End: 2024-01-11
Payer: COMMERCIAL

## 2024-01-11 DIAGNOSIS — R10.9 LEFT FLANK PAIN: ICD-10-CM

## 2024-01-11 DIAGNOSIS — I10 ESSENTIAL HYPERTENSION: ICD-10-CM

## 2024-01-11 LAB
ALBUMIN SERPL-MCNC: 4.7 G/DL (ref 3.5–5)
ALP BLD-CCNC: 105 U/L (ref 35–125)
ALT SERPL-CCNC: 19 U/L (ref 5–40)
AMORPH CRY #/AREA UR COMP ASSIST: ABNORMAL /HPF
ANION GAP SERPL CALC-SCNC: 14 MMOL/L
APPEARANCE UR: CLEAR
AST SERPL-CCNC: 19 U/L (ref 5–40)
BACTERIA #/AREA URNS AUTO: ABNORMAL /HPF
BILIRUB SERPL-MCNC: 0.6 MG/DL (ref 0.1–1.2)
BILIRUB UR STRIP.AUTO-MCNC: NEGATIVE MG/DL
BUN SERPL-MCNC: 16 MG/DL (ref 8–25)
CALCIUM SERPL-MCNC: 9.9 MG/DL (ref 8.5–10.4)
CHLORIDE SERPL-SCNC: 104 MMOL/L (ref 97–107)
CHOLEST SERPL-MCNC: 212 MG/DL (ref 133–200)
CHOLEST/HDLC SERPL: 4.2 {RATIO}
CO2 SERPL-SCNC: 24 MMOL/L (ref 24–31)
COLOR UR: COLORLESS
CREAT SERPL-MCNC: 0.8 MG/DL (ref 0.4–1.6)
EGFRCR SERPLBLD CKD-EPI 2021: 82 ML/MIN/1.73M*2
ERYTHROCYTE [DISTWIDTH] IN BLOOD BY AUTOMATED COUNT: 13.8 % (ref 11.5–14.5)
GLUCOSE SERPL-MCNC: 109 MG/DL (ref 65–99)
GLUCOSE UR STRIP.AUTO-MCNC: NORMAL MG/DL
HCT VFR BLD AUTO: 43.8 % (ref 36–46)
HDLC SERPL-MCNC: 50 MG/DL
HGB BLD-MCNC: 14.2 G/DL (ref 12–16)
KETONES UR STRIP.AUTO-MCNC: NEGATIVE MG/DL
LDLC SERPL CALC-MCNC: 129 MG/DL (ref 65–130)
LEUKOCYTE ESTERASE UR QL STRIP.AUTO: ABNORMAL
MCH RBC QN AUTO: 30.1 PG (ref 26–34)
MCHC RBC AUTO-ENTMCNC: 32.4 G/DL (ref 32–36)
MCV RBC AUTO: 93 FL (ref 80–100)
MUCOUS THREADS #/AREA URNS AUTO: ABNORMAL /LPF
NITRITE UR QL STRIP.AUTO: NEGATIVE
NRBC BLD-RTO: 0 /100 WBCS (ref 0–0)
PH UR STRIP.AUTO: 5.5 [PH]
PLATELET # BLD AUTO: 201 X10*3/UL (ref 150–450)
POTASSIUM SERPL-SCNC: 4.3 MMOL/L (ref 3.4–5.1)
PROT SERPL-MCNC: 6.8 G/DL (ref 5.9–7.9)
PROT UR STRIP.AUTO-MCNC: NEGATIVE MG/DL
RBC # BLD AUTO: 4.72 X10*6/UL (ref 4–5.2)
RBC # UR STRIP.AUTO: NEGATIVE /UL
RBC #/AREA URNS AUTO: ABNORMAL /HPF
SODIUM SERPL-SCNC: 142 MMOL/L (ref 133–145)
SP GR UR STRIP.AUTO: 1.01
SQUAMOUS #/AREA URNS AUTO: ABNORMAL /HPF
TRIGL SERPL-MCNC: 167 MG/DL (ref 40–150)
TSH SERPL DL<=0.05 MIU/L-ACNC: 1.99 MIU/L (ref 0.27–4.2)
UROBILINOGEN UR STRIP.AUTO-MCNC: NORMAL MG/DL
WBC # BLD AUTO: 6 X10*3/UL (ref 4.4–11.3)
WBC #/AREA URNS AUTO: ABNORMAL /HPF

## 2024-01-11 PROCEDURE — 36415 COLL VENOUS BLD VENIPUNCTURE: CPT

## 2024-01-11 PROCEDURE — 85027 COMPLETE CBC AUTOMATED: CPT

## 2024-01-11 PROCEDURE — 80053 COMPREHEN METABOLIC PANEL: CPT

## 2024-01-11 PROCEDURE — 81001 URINALYSIS AUTO W/SCOPE: CPT

## 2024-01-11 PROCEDURE — 80061 LIPID PANEL: CPT

## 2024-01-11 PROCEDURE — 84443 ASSAY THYROID STIM HORMONE: CPT

## 2024-01-15 ENCOUNTER — APPOINTMENT (OUTPATIENT)
Dept: PRIMARY CARE | Facility: CLINIC | Age: 64
End: 2024-01-15
Payer: COMMERCIAL

## 2024-01-15 ENCOUNTER — TELEPHONE (OUTPATIENT)
Dept: PRIMARY CARE | Facility: CLINIC | Age: 64
End: 2024-01-15

## 2024-01-15 ENCOUNTER — LAB (OUTPATIENT)
Dept: LAB | Facility: LAB | Age: 64
End: 2024-01-15
Payer: COMMERCIAL

## 2024-01-15 DIAGNOSIS — R30.0 DYSURIA: ICD-10-CM

## 2024-01-15 DIAGNOSIS — R30.0 DYSURIA: Primary | ICD-10-CM

## 2024-01-15 NOTE — TELEPHONE ENCOUNTER
"Nurse originally called patient to reschedule appmt for tomorrow morning. Patient became very audibly upset screaming at nurse. Patient states her test results were resulted 1/12/24 and \"Why am I just now getting the results now. Dr. Tyler should've given me the results on the 12th. I could've given another sample on the 12th.\" Patient states \"I waited a year for this appointment.\" Patient demanded a urine culture order be put in so she can go to the lab to have this done. Nurse offered patient a 15 min appmt slot appointment Wednesday at 8:45 am with PCP for UTI symptoms as PCP is not in office tomorrow and there are no open appointments tomorrow. Also offered patient an appointment with NP. Patient declined this as well. I also offered patient to put a message in to see if a provider that is in office can put in a repeat urine order per results from Dr. Tyler. Patient refused Wednesday appmt stating that she was not going to come in for an appointment for that Wednesday and then reschedule appmt for her 6 month appointment and have to pay for both.  Patient continued yelling stating \"I could end up with pilonidal nephritis\"  Patient states \"I'll be taking this as high up as it can go. 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 I'm taking this to the highest level it can go\" Patient continued to scream at nurse x 15 min phone call. Apologized and then continued to scream at nurse on the phone. NP put culture order in for patient. I called patient back and left a voicemail to notify patient that order was placed and she can go to one of the  labs to have this done.         "

## 2024-01-16 ENCOUNTER — LAB (OUTPATIENT)
Dept: LAB | Facility: LAB | Age: 64
End: 2024-01-16
Payer: COMMERCIAL

## 2024-01-16 ENCOUNTER — APPOINTMENT (OUTPATIENT)
Dept: PRIMARY CARE | Facility: CLINIC | Age: 64
End: 2024-01-16
Payer: COMMERCIAL

## 2024-01-16 DIAGNOSIS — R30.0 DYSURIA: ICD-10-CM

## 2024-01-16 PROCEDURE — 87086 URINE CULTURE/COLONY COUNT: CPT

## 2024-01-16 NOTE — TELEPHONE ENCOUNTER
Notification from the lab received urine she dropped at [facility] last night was left out and was not viable for Culture. Order was cancelled. I called patient to notify her of this and apologized on behalf of the lab for this error. New order for culture was placed and patient is going to [place] now to complete. Patient seeing PCP Thursday for follow up and will review results at that time. Provided patient my direct line to call me once she gives sample and I will verify with lab it is sent to be run and stored properly to avoid future issues. Patient is grateful for the assistance and I again provided an apology on behalf of the lab for this error. Patient going now to lab. Order entered.

## 2024-01-17 ENCOUNTER — APPOINTMENT (OUTPATIENT)
Dept: PRIMARY CARE | Facility: CLINIC | Age: 64
End: 2024-01-17
Payer: COMMERCIAL

## 2024-01-17 LAB — BACTERIA UR CULT: NORMAL

## 2024-01-17 NOTE — TELEPHONE ENCOUNTER
Thank you so very much for sorting out this patient's urine culture.  Will review results and plan to discuss with patient at her upcoming appointment.  I have attempted to thoroughly review the chart in regards to where patient's initial orders came from as I cannot find correlating messages on why we were checking a urine sample in the first place.  I do see a diagnosis of left flank pain, though do not have any correlation to recent complaints.  Similarly, will plan to discuss if patient is actively having symptoms as we evaluate what her results are.

## 2024-01-18 ENCOUNTER — OFFICE VISIT (OUTPATIENT)
Dept: PRIMARY CARE | Facility: CLINIC | Age: 64
End: 2024-01-18
Payer: COMMERCIAL

## 2024-01-18 VITALS
HEART RATE: 69 BPM | OXYGEN SATURATION: 99 % | TEMPERATURE: 97.5 F | SYSTOLIC BLOOD PRESSURE: 130 MMHG | BODY MASS INDEX: 28.89 KG/M2 | WEIGHT: 157 LBS | HEIGHT: 62 IN | DIASTOLIC BLOOD PRESSURE: 86 MMHG

## 2024-01-18 DIAGNOSIS — F41.9 ANXIETY: ICD-10-CM

## 2024-01-18 DIAGNOSIS — K21.9 GASTROESOPHAGEAL REFLUX DISEASE, UNSPECIFIED WHETHER ESOPHAGITIS PRESENT: ICD-10-CM

## 2024-01-18 DIAGNOSIS — E78.2 MIXED HYPERLIPIDEMIA: ICD-10-CM

## 2024-01-18 DIAGNOSIS — K58.9 IRRITABLE BOWEL SYNDROME, UNSPECIFIED TYPE: ICD-10-CM

## 2024-01-18 DIAGNOSIS — R73.9 HYPERGLYCEMIA: ICD-10-CM

## 2024-01-18 DIAGNOSIS — R73.01 IFG (IMPAIRED FASTING GLUCOSE): ICD-10-CM

## 2024-01-18 DIAGNOSIS — I10 ESSENTIAL HYPERTENSION: Primary | ICD-10-CM

## 2024-01-18 DIAGNOSIS — I10 ESSENTIAL HYPERTENSION: ICD-10-CM

## 2024-01-18 DIAGNOSIS — R10.9 LEFT FLANK PAIN: ICD-10-CM

## 2024-01-18 DIAGNOSIS — K57.92 DIVERTICULITIS: ICD-10-CM

## 2024-01-18 PROCEDURE — 99214 OFFICE O/P EST MOD 30 MIN: CPT | Performed by: FAMILY MEDICINE

## 2024-01-18 PROCEDURE — 3079F DIAST BP 80-89 MM HG: CPT | Performed by: FAMILY MEDICINE

## 2024-01-18 PROCEDURE — 1036F TOBACCO NON-USER: CPT | Performed by: FAMILY MEDICINE

## 2024-01-18 PROCEDURE — 3075F SYST BP GE 130 - 139MM HG: CPT | Performed by: FAMILY MEDICINE

## 2024-01-18 RX ORDER — CIPROFLOXACIN 500 MG/1
500 TABLET ORAL 2 TIMES DAILY
Qty: 20 TABLET | Refills: 0 | Status: SHIPPED | OUTPATIENT
Start: 2024-01-18 | End: 2024-01-28

## 2024-01-18 RX ORDER — PANTOPRAZOLE SODIUM 40 MG/1
40 TABLET, DELAYED RELEASE ORAL
Qty: 90 TABLET | Refills: 1 | Status: SHIPPED | OUTPATIENT
Start: 2024-01-18 | End: 2024-07-16

## 2024-01-18 RX ORDER — METRONIDAZOLE 500 MG/1
500 TABLET ORAL 3 TIMES DAILY
Qty: 21 TABLET | Refills: 0 | Status: SHIPPED | OUTPATIENT
Start: 2024-01-18 | End: 2024-01-25

## 2024-01-18 RX ORDER — AMLODIPINE BESYLATE 5 MG/1
5 TABLET ORAL DAILY
Qty: 90 TABLET | Refills: 1 | Status: SHIPPED | OUTPATIENT
Start: 2024-01-18 | End: 2024-07-16

## 2024-01-18 RX ORDER — DICYCLOMINE HYDROCHLORIDE 10 MG/1
10 CAPSULE ORAL 3 TIMES DAILY
Qty: 90 CAPSULE | Refills: 0 | Status: SHIPPED | OUTPATIENT
Start: 2024-01-18 | End: 2024-02-17

## 2024-01-18 RX ORDER — LORAZEPAM 0.5 MG/1
0.5 TABLET ORAL EVERY 6 HOURS PRN
Qty: 28 TABLET | Refills: 0 | Status: SHIPPED | OUTPATIENT
Start: 2024-01-18 | End: 2024-01-25

## 2024-01-18 RX ORDER — LOSARTAN POTASSIUM 100 MG/1
100 TABLET ORAL DAILY
Qty: 90 TABLET | Refills: 1 | Status: SHIPPED | OUTPATIENT
Start: 2024-01-18 | End: 2024-07-16

## 2024-01-18 ASSESSMENT — PATIENT HEALTH QUESTIONNAIRE - PHQ9
SUM OF ALL RESPONSES TO PHQ9 QUESTIONS 1 AND 2: 0
1. LITTLE INTEREST OR PLEASURE IN DOING THINGS: NOT AT ALL
2. FEELING DOWN, DEPRESSED OR HOPELESS: NOT AT ALL

## 2024-01-18 ASSESSMENT — PAIN SCALES - GENERAL: PAINLEVEL: 6

## 2024-01-18 NOTE — ASSESSMENT & PLAN NOTE
- Recent urinalysis showed presence of leukocytes and bacteria though culture showed no activity.  This may represent contamination of initial sample  -

## 2024-01-18 NOTE — PATIENT INSTRUCTIONS
Problem List Items Addressed This Visit             ICD-10-CM    Essential hypertension - Primary I10     - Blood pressure stable on current regimen  -Continue to focus on healthy, balanced diet with moderation of salt/caffeine/alcohol and moderation of stress         Relevant Medications    losartan (Cozaar) 100 mg tablet    amLODIPine (Norvasc) 5 mg tablet    Gastroesophageal reflux disease K21.9     - Stable         Relevant Medications    pantoprazole (ProtoNix) 40 mg EC tablet    IFG (impaired fasting glucose) R73.01     - Continue to focus on healthy, low-fat diet with moderation of carbohydrates/sugars         Irritable bowel syndrome K58.9     - Refill on as needed Bentyl sent per request         Relevant Medications    dicyclomine (Bentyl) 10 mg capsule    Left flank pain R10.9     - Recent urinalysis showed presence of leukocytes and bacteria though culture showed no activity.  This may represent contamination of initial sample  -          Anxiety F41.9     - Will continue with as needed anxiety medication sparingly         Relevant Medications    LORazepam (Ativan) 0.5 mg tablet    Mixed hyperlipidemia E78.2     - Healthy low-fat diet encouraged         Relevant Orders    Comprehensive metabolic panel    Lipid panel    Diverticulitis K57.92     - With history and recent symptoms, will plan to initiate antibiotic medication leaning on Cipro which would additionally cover against kidney infection  -If symptoms persist despite Cipro use, will add in Flagyl though defer initially secondary to history of poor tolerance         Relevant Medications    ciprofloxacin (Cipro) 500 mg tablet    metroNIDAZOLE (Flagyl) 500 mg tablet    Hyperglycemia R73.9     - Will focus on diligent lifestyle modification including healthy, balanced diet and moderation of carb/sugars  -Will plan to check A1c sugar average with next panel blood work prior to next encounter         Relevant Orders    Hemoglobin A1c       Counseling:        Medication education:         Education:  The patient is counseled regarding potential side-effects of all new medications        Understanding:  Patient expressed understanding        Adherence:  No barriers to adherence identified

## 2024-01-18 NOTE — ASSESSMENT & PLAN NOTE
- With history and recent symptoms, will plan to initiate antibiotic medication leaning on Cipro which would additionally cover against kidney infection  -If symptoms persist despite Cipro use, will add in Flagyl though defer initially secondary to history of poor tolerance

## 2024-01-18 NOTE — ASSESSMENT & PLAN NOTE
- Blood pressure stable on current regimen  -Continue to focus on healthy, balanced diet with moderation of salt/caffeine/alcohol and moderation of stress

## 2024-01-18 NOTE — ASSESSMENT & PLAN NOTE
- Will focus on diligent lifestyle modification including healthy, balanced diet and moderation of carb/sugars  -Will plan to check A1c sugar average with next panel blood work prior to next encounter

## 2024-01-18 NOTE — PROGRESS NOTES
Outpatient Visit Note    Chief Complaint   Patient presents with    Follow-up     6 month f/u         HPI:  Angie Hill is a 63 y.o. female a past medical history significant for hypertension, GERD, anxiety and bilateral knee pain who presents to the office for follow-up.  She was last seen in the office on 11/28/2023 secondary to concerns regarding sinus infection.    Of note, at prior encounter in October she noted persistent left flank pain for the last several months.  Had a history of sciatica to which she has been actively working with chiropractic medicine.  Was able to help with low back/sciatic issues though flank discomfort had continued.  Denied any urinary changes, hematuria, urgency or frequency.  Blood work was recently completed on 1/12/2024 including CBC, CMP, lipid panel TSH which was notable for hyperglycemia and hyperlipidemia.  Urinalysis was completed at that time which showed positive leukocytes and bacteria on microscopy.  Urine culture was not collected though patient did give recent sample with results showing no growth.    She does state to have continued elements of intermittent low back/flank pain.  Symptoms did flareup significantly approximately 2 weeks ago prompting her to get her test results done.  Additionally notes history of irritable bowel symptoms with diverticular disease and occasional diverticulitis flares.  Does express concerns of possible diverticular flare but denies systemic complaint such as fever, chills or significant bowel changes.  Has traditionally used as needed Bentyl with prescription refill request at this time.    Had been previously established with primary care providers at [facility], in which she was in the process of moving to the area at last encounter.  Had been last seen by her former PCP for routine follow-up in February at which time blood work was completed including CBC, CMP lipid panel. Blood work was remarkable for hyperglycemia  and mildly elevated LDL. Beyond her primary care provider, she does have established relationship with GI to whom she last saw in May for repeat endoscopy. States the GERD has been stable on pantoprazole.    Hypertension:  Has historically manage her high blood pressure with losartan 100 mg and amlodipine 5 mg daily which has been well-tolerated without adverse side effects.  Denies any chest pain, shortness a breath, lightheadedness or dizziness.  Has had recent stressors related to moving.    In regards to her stress level, previously expressed to have been under significant stress following her move. Traditionally managed her anxiety with infrequent as needed lorazepam.  Reports ongoing stressors with her home set to be completed in November. Denies recent anxiety attacks or other symptomatic complaints such as major depressive episodes, anhedonia, SI/HI.  Is requesting prescription refill on her as needed benzodiazepine which continues to be effective    Current Medications  Current Outpatient Medications   Medication Instructions    amLODIPine (NORVASC) 5 mg, oral, Daily    ciprofloxacin (CIPRO) 500 mg, oral, 2 times daily    dicyclomine (BENTYL) 10 mg, oral, 3 times daily    LORazepam (ATIVAN) 0.5 mg, oral, Every 6 hours PRN    losartan (COZAAR) 100 mg, oral, Daily    meloxicam (Mobic) 15 mg tablet 1 tablet, oral, Daily PRN    metroNIDAZOLE (FLAGYL) 500 mg, oral, 3 times daily    multivitamin tablet TAKE 1 TABLET DAILY.           OTC    pantoprazole (PROTONIX) 40 mg, oral, Daily before breakfast, Do not crush, chew, or split.        Allergies  Allergies   Allergen Reactions    Tetracyclines GI Upset    Penicillins Rash        Past Medical History:   Diagnosis Date    Anxiety     GERD (gastroesophageal reflux disease)     Hypertension     Personal history of other mental and behavioral disorders 08/08/2022    History of anxiety    Skin cancer (melanoma) (CMS/[facility]) 12/2023      Past Surgical History:   Procedure  Laterality Date     SECTION, CLASSIC  2016     Section    LAPAROSCOPY DIAGNOSTIC / BIOPSY / ASPIRATION / LYSIS  2016    Exploratory Laparoscopy    OTHER SURGICAL HISTORY  10/12/2021    Knee surgery     Family History   Problem Relation Name Age of Onset    Heart disease Mother      Hypertension Mother      Lung disease Father      Ovarian cancer Sister       Social History     Tobacco Use    Smoking status: Never    Smokeless tobacco: Never   Vaping Use    Vaping Use: Never used   Substance Use Topics    Alcohol use: Not Currently    Drug use: Never       ROS  All pertinent positive symptoms are included in the history of present illness.  All other systems have been reviewed and are negative and noncontributory to this patient's current ailments.    VITAL SIGNS  Vitals:    24 0829   BP: 130/86   Pulse: 69   Temp: 36.4 °C (97.5 °F)   SpO2: 99%       PHYSICAL EXAM  GENERAL APPEARANCE: alert and oriented, Pleasant and cooperative, No Acute Distress  HEENT: EOMI, PERRLA, MMM  HEART: RRR, normal S1S2, no murmurs, click or rubs  LUNGS: clear to auscultation bilaterally, no wheezes/rhonchi/rales  EXTREMITIES: no edema, normal ROM  SKIN: normal, no rash, unremarkable  NEUROLOGIC EXAM: non-focal exam  MUSCULOSKELETAL: no gross abnormalities  PSYCH: affect is normal, eye contact is good      Assessment/Plan   Problem List Items Addressed This Visit             ICD-10-CM    Essential hypertension - Primary I10     - Blood pressure stable on current regimen  -Continue to focus on healthy, balanced diet with moderation of salt/caffeine/alcohol and moderation of stress         Relevant Medications    losartan (Cozaar) 100 mg tablet    amLODIPine (Norvasc) 5 mg tablet    Gastroesophageal reflux disease K21.9     - Stable         Relevant Medications    pantoprazole (ProtoNix) 40 mg EC tablet    IFG (impaired fasting glucose) R73.01     - Continue to focus on healthy, low-fat diet with moderation  of carbohydrates/sugars         Irritable bowel syndrome K58.9     - Refill on as needed Bentyl sent per request         Relevant Medications    dicyclomine (Bentyl) 10 mg capsule    Left flank pain R10.9     - Recent urinalysis showed presence of leukocytes and bacteria though culture showed no activity.  This may represent contamination of initial sample  -          Anxiety F41.9     - Will continue with as needed anxiety medication sparingly         Relevant Medications    LORazepam (Ativan) 0.5 mg tablet    Mixed hyperlipidemia E78.2     - Healthy low-fat diet encouraged         Relevant Orders    Comprehensive metabolic panel    Lipid panel    Diverticulitis K57.92     - With history and recent symptoms, will plan to initiate antibiotic medication leaning on Cipro which would additionally cover against kidney infection  -If symptoms persist despite Cipro use, will add in Flagyl though defer initially secondary to history of poor tolerance         Relevant Medications    ciprofloxacin (Cipro) 500 mg tablet    metroNIDAZOLE (Flagyl) 500 mg tablet    Hyperglycemia R73.9     - Will focus on diligent lifestyle modification including healthy, balanced diet and moderation of carb/sugars  -Will plan to check A1c sugar average with next panel blood work prior to next encounter         Relevant Orders    Hemoglobin A1c       Counseling:       Medication education:         Education:  The patient is counseled regarding potential side-effects of all new medications        Understanding:  Patient expressed understanding        Adherence:  No barriers to adherence identified

## 2024-01-22 ENCOUNTER — HOSPITAL ENCOUNTER (OUTPATIENT)
Dept: RADIOLOGY | Facility: CLINIC | Age: 64
Discharge: HOME | End: 2024-01-22
Payer: COMMERCIAL

## 2024-01-22 DIAGNOSIS — Z12.31 SCREENING MAMMOGRAM FOR BREAST CANCER: ICD-10-CM

## 2024-01-22 PROCEDURE — 77063 BREAST TOMOSYNTHESIS BI: CPT | Mod: BILATERAL PROCEDURE | Performed by: RADIOLOGY

## 2024-01-22 PROCEDURE — 77067 SCR MAMMO BI INCL CAD: CPT | Mod: BILATERAL PROCEDURE | Performed by: RADIOLOGY

## 2024-01-22 PROCEDURE — 77067 SCR MAMMO BI INCL CAD: CPT

## 2024-01-22 RX ORDER — AMLODIPINE BESYLATE 5 MG/1
5 TABLET ORAL DAILY
Qty: 90 TABLET | Refills: 1 | Status: SHIPPED | OUTPATIENT
Start: 2024-01-22 | End: 2024-07-20

## 2024-01-25 ENCOUNTER — HOSPITAL ENCOUNTER (OUTPATIENT)
Dept: RADIOLOGY | Facility: CLINIC | Age: 64
Discharge: HOME | End: 2024-01-25
Payer: COMMERCIAL

## 2024-01-25 DIAGNOSIS — Z12.31 SCREENING MAMMOGRAM FOR BREAST CANCER: ICD-10-CM

## 2024-01-25 PROCEDURE — A9575 INJ GADOTERATE MEGLUMI 0.1ML: HCPCS | Performed by: NURSE PRACTITIONER

## 2024-01-25 PROCEDURE — 6100000003 BI MR BREAST BILATERAL WITH CONTRAST FAST SCREENING SELF PAY

## 2024-01-25 PROCEDURE — 2550000001 HC RX 255 CONTRASTS: Performed by: NURSE PRACTITIONER

## 2024-01-25 RX ORDER — GADOTERATE MEGLUMINE 376.9 MG/ML
15 INJECTION INTRAVENOUS
Status: COMPLETED | OUTPATIENT
Start: 2024-01-25 | End: 2024-01-25

## 2024-01-25 RX ADMIN — GADOTERATE MEGLUMINE 15 ML: 376.9 INJECTION INTRAVENOUS at 10:17

## 2024-02-07 ENCOUNTER — PROCEDURE VISIT (OUTPATIENT)
Dept: DERMATOLOGY | Facility: CLINIC | Age: 64
End: 2024-02-07
Payer: COMMERCIAL

## 2024-02-07 DIAGNOSIS — C44.519 BASAL CELL CARCINOMA (BCC) OF SKIN OF TRUNK: ICD-10-CM

## 2024-02-07 DIAGNOSIS — C43.61 MALIGNANT MELANOMA OF RIGHT UPPER EXTREMITY (MULTI): Primary | ICD-10-CM

## 2024-02-07 PROCEDURE — 88305 TISSUE EXAM BY PATHOLOGIST: CPT | Performed by: DERMATOLOGY

## 2024-02-07 PROCEDURE — 11603 EXC TR-EXT MAL+MARG 2.1-3 CM: CPT | Performed by: DERMATOLOGY

## 2024-02-07 PROCEDURE — 17262 DSTRJ MAL LES T/A/L 1.1-2.0: CPT | Performed by: DERMATOLOGY

## 2024-02-07 PROCEDURE — 12034 INTMD RPR S/TR/EXT 7.6-12.5: CPT | Performed by: DERMATOLOGY

## 2024-02-07 ASSESSMENT — DERMATOLOGY PATIENT ASSESSMENT
HAVE YOU HAD OR DO YOU HAVE VASCULAR DISEASE: NO
ARE YOU ON BIRTH CONTROL: NO
DO YOU HAVE ANY NEW OR CHANGING LESIONS: NO
FOR PATIENTS COMING IN FOR A FOLLOW-UP VISIT - HAVE THERE BEEN ANY CHANGES IN YOUR HEALTH SINCE YOUR LAST VISIT: NO
DO YOU USE SUNSCREEN: OCCASIONALLY
DO YOU HAVE IRREGULAR MENSTRUAL CYCLES: NO
HAVE YOU HAD OR DO YOU HAVE A STAPH INFECTION: NO
DO YOU USE A TANNING BED: NO
ARE YOU AN ORGAN TRANSPLANT RECIPIENT: NO
ARE YOU TRYING TO GET PREGNANT: NO

## 2024-02-07 ASSESSMENT — DERMATOLOGY QUALITY OF LIFE (QOL) ASSESSMENT
WHAT SINGLE SKIN CONDITION LISTED BELOW IS THE PATIENT ANSWERING THE QUALITY-OF-LIFE ASSESSMENT QUESTIONS ABOUT: NONE OF THE ABOVE
RATE HOW EMOTIONALLY BOTHERED YOU ARE BY YOUR SKIN PROBLEM (FOR EXAMPLE, WORRY, EMBARRASSMENT, FRUSTRATION): 0 - NEVER BOTHERED
RATE HOW BOTHERED YOU ARE BY EFFECTS OF YOUR SKIN PROBLEMS ON YOUR ACTIVITIES (EG, GOING OUT, ACCOMPLISHING WHAT YOU WANT, WORK ACTIVITIES OR YOUR RELATIONSHIPS WITH OTHERS): 0 - NEVER BOTHERED
ARE THERE EXCLUSIONS OR EXCEPTIONS FOR THE QUALITY OF LIFE ASSESSMENT: NO
RATE HOW BOTHERED YOU ARE BY SYMPTOMS OF YOUR SKIN PROBLEM (EG, ITCHING, STINGING BURNING, HURTING OR SKIN IRRITATION): 0 - NEVER BOTHERED
DATE THE QUALITY-OF-LIFE ASSESSMENT WAS COMPLETED: 66877

## 2024-02-07 ASSESSMENT — ITCH NUMERIC RATING SCALE: HOW SEVERE IS YOUR ITCHING?: 0

## 2024-02-07 ASSESSMENT — PATIENT GLOBAL ASSESSMENT (PGA): PATIENT GLOBAL ASSESSMENT: PATIENT GLOBAL ASSESSMENT:  1 - CLEAR

## 2024-02-07 NOTE — PROGRESS NOTES
Subjective     Angie Hill is a 63 y.o. female who presents for the following: Excision (Pt here for excision of ACMN on Right Superior Shoulder. Bx proven 12/20/23) and ED&C (Electrodesiccation & Curettage) (Pt here for ED&C of BCC on Right Upper Back. Bx proven 12/20/23).     Review of Systems:  No other skin or systemic complaints other than what is documented elsewhere in the note.    The following portions of the chart were reviewed this encounter and updated as appropriate:          Skin Cancer History  Biopsy Date Type Location Status   12/20/23 BCC, Superficial Right Upper Back Schedule Procedure   ACMN concerning for melanoma, Breslow depth 0.1mm    Specialty Problems          Dermatology Problems    Other nail disorders        Objective   Well appearing patient in no apparent distress; mood and affect are within normal limits.    A focused skin examination was performed of the right shoulder, right upper back. All findings within normal limits unless otherwise noted below.    Assessment/Plan   1. Malignant melanoma of right upper extremity (CMS/HCC)  Right superior shoulder  Scar from biopsy    Skin excision    Lesion length (cm):  0.8  Lesion width (cm):  0.8  Margin per side (cm):  1  Total excision diameter (cm):  2.8  Melanoma Breslow depth (mm): 0  Informed consent: discussed and consent obtained    Timeout: patient name, date of birth, surgical site, and procedure verified    Anesthesia: the lesion was anesthetized in a standard fashion    Anesthetic:  1% lidocaine w/ epinephrine 1-100,000 local infiltration  Instrument used: #15 blade    Hemostasis achieved with: electrodesiccation    Outcome: patient tolerated procedure well with no complications      Skin repair  Complexity:  Intermediate  Final length (cm):  8  Informed consent: discussed and consent obtained    Timeout: patient name, date of birth, surgical site, and procedure verified    Procedure prep:  Patient prepped in sterile  fashion  Reason for type of repair: reduce tension to allow closure, preserve normal anatomical and functional relationships and enhance both functionality and cosmetic results    Undermining: edges could be approximated without difficulty    Subcutaneous layers (deep stitches):   Suture size:  3-0  Suture type: Monocryl (poliglecaprone 25)    Stitches:  Buried vertical mattress  Fine/surface layer approximation (top stitches):   Suture size:  4-0  Suture type: Monocryl (poliglecaprone 25)    Stitches: running subcuticular    Hemostasis achieved with: suture  Outcome: patient tolerated procedure well with no complications    Post-procedure details: sterile dressing applied    Dressing type: petrolatum and pressure dressing (steri-strips)      Specimen 1 - Dermatopathology- DERM LAB  Differential Diagnosis: Melanoma vs. scar  Check Margins Yes/No?:  yes  Comments:  [id number]  Dermpath Lab: Routine Histopathology (formalin-fixed tissue)    Melanoma is a skin cancer of pigment cells and treatment, prognosis is based on a histologic reported breslow depth, which yours is 0.1mm.     Lesion is treated with wide local excision with 1cm margin based on this. This tissue will be sent for pathology and we will contact you with results.    We recommend following up in 6 months for re-evaluation.    Related Procedures  Follow Up In Dermatology - Established Patient    2. Basal cell carcinoma (BCC) of skin of trunk  Right Upper Back  Scar from previous biopsy    Destr of lesion    Destruction method: electrodesiccation and curettage    Informed consent: discussed and consent obtained    Timeout:  patient name, date of birth, surgical site, and procedure verified  Procedure prep:  Patient prepped in sterile fashion  Anesthesia: the lesion was anesthetized in a standard fashion    Anesthetic:  1% lidocaine w/ epinephrine 1-100,000 local infiltration  Curettage performed in three different directions: Yes    Electrodesiccation  performed over the curetted area: Yes    Curettage cycles:  3  Lesion length (cm):  0.8  Lesion width (cm):  0.8  Margin per side (cm):  0.3  Final wound size (cm):  1.4  Hemostasis achieved with:  electrodesiccation  Outcome: patient tolerated procedure well with no complications    Post-procedure details: sterile dressing applied and wound care instructions given    Dressing type: bandage and pressure dressing      Staff Communication: Dermatology Local Anesthesia: 1 % Lidocaine / Epinephrine - Amount:12cc    Basal cell carcinoma is a type of skin cancer that most frequently occurs secondary to sun exposure.  If left untreated these lesions can grow large, ulcerate the skin and bleed. In very rare circumstances they can spread to other areas of the body.    Treatment is dependent upon the size of the lesion, location and histological subtype.      Follow up in 6 months.

## 2024-02-09 LAB
LABORATORY COMMENT REPORT: NORMAL
PATH REPORT.FINAL DX SPEC: NORMAL
PATH REPORT.GROSS SPEC: NORMAL
PATH REPORT.MICROSCOPIC SPEC OTHER STN: NORMAL
PATH REPORT.RELEVANT HX SPEC: NORMAL
PATH REPORT.TOTAL CANCER: NORMAL

## 2024-04-03 ENCOUNTER — OFFICE VISIT (OUTPATIENT)
Dept: ORTHOPEDIC SURGERY | Facility: CLINIC | Age: 64
End: 2024-04-03
Payer: COMMERCIAL

## 2024-04-03 DIAGNOSIS — M17.12 PRIMARY OSTEOARTHRITIS OF LEFT KNEE: Primary | ICD-10-CM

## 2024-04-03 PROCEDURE — 1036F TOBACCO NON-USER: CPT | Performed by: FAMILY MEDICINE

## 2024-04-03 PROCEDURE — 99213 OFFICE O/P EST LOW 20 MIN: CPT | Performed by: FAMILY MEDICINE

## 2024-04-03 PROCEDURE — 20610 DRAIN/INJ JOINT/BURSA W/O US: CPT | Performed by: FAMILY MEDICINE

## 2024-04-03 RX ORDER — MELOXICAM 15 MG/1
15 TABLET ORAL DAILY PRN
Qty: 90 TABLET | Refills: 0 | Status: SHIPPED | OUTPATIENT
Start: 2024-04-03

## 2024-04-03 RX ORDER — TRIAMCINOLONE ACETONIDE 40 MG/ML
40 INJECTION, SUSPENSION INTRA-ARTICULAR; INTRAMUSCULAR
Status: COMPLETED | OUTPATIENT
Start: 2024-04-03 | End: 2024-04-03

## 2024-04-03 RX ORDER — LIDOCAINE HYDROCHLORIDE 10 MG/ML
4 INJECTION INFILTRATION; PERINEURAL
Status: COMPLETED | OUTPATIENT
Start: 2024-04-03 | End: 2024-04-03

## 2024-04-03 RX ADMIN — TRIAMCINOLONE ACETONIDE 40 MG: 40 INJECTION, SUSPENSION INTRA-ARTICULAR; INTRAMUSCULAR at 07:48

## 2024-04-03 RX ADMIN — LIDOCAINE HYDROCHLORIDE 4 ML: 10 INJECTION INFILTRATION; PERINEURAL at 07:48

## 2024-04-03 NOTE — PROGRESS NOTES
** Please excuse any errors in grammar or translation related to this dictation. Voice recognition software was utilized to prepare this document. **    Assessment & Plan:  Patient here for ongoing management of left knee arthritis.  Continues to do well after receiving Synvisc 1 injection in October.  However she did start to notice some increasing pains with  activity.  For this reason requesting repeat steroid injection which was completed as below.  Informed her that repeat Synvisc 1 injection can be completed anytime in the future that she wants proceed.  She will contact clinic to start authorization process when she wants to move forward.  She requests refill of meloxicam to be used as needed for intermittent symptoms which was reordered.  Return precautions given.  Follow-up as needed for continued nonoperative management of her knee arthritis.      Chief complaint: left knee pain    HPI:  4/3/24: Patient reports Synvisc-One injection from 10/26 is still providing her with some relief.  She is starting to have occasional achiness in the left knee and would like to supplement it with a cortisone injection.  Denies any new injuries.     10/26/23: Patient presents for left knee Synvisc-One injection.  Reports she responded well to steroid injection is having minimal symptoms at this time.      8/31/23: 63-year-old female history of left knee arthritis referred by Dr. Kamara for hyaluronic acid injection consult.  Patient reports ongoing left knee pain for a little over 1 year. She reports previous treatment with steroid injection followed by hyaluronic acid injection. The Synvisc-1 injection was completed in October 2022. Reports this gave her approximately 5 to 6 months of pain relief. She recently follow-up with Dr. Kamara to discuss ongoing treatments of this condition. She was informed that due to the underlying arthritis in her knee that she would not be a candidate for arthroscopic surgery. If wanted to  pursue surgical options it would be for total joint replacement. She feels that her symptoms are not severe and like to continue nonoperative management at this time.     Exam:  Left knee examined. No effusion, ecchymosis, or erythema. AROM from 0 to 130 deg with 5/5 strength. No joint line tenderness. No ttp with patellar compression.     General Exam:  Constitutional - NAD, AAO x 3, conversing appropriately.  HEENT- Normocephalic and atraumatic. EOMI, PERRLA, No scleral icterus. No facial deformities. Hearing grossly normal.  Lungs - Breathing non-labored with normal rate. No accessory muscle use.  CV - Extremities warm and well-perfused, brisk capillary refill present.   Neuro - CN II-XII grossly intact.    Results:  X-rays of left knee obtained 7/14/2022 : mild degenerative changes.  MRI of left knee obtained 8/18/2022 : moderate medial compartment degenerative changes.      Procedure:  Patient ID: Angie Hill is a 63 y.o. female.    L Inj/Asp: L knee on 4/3/2024 7:48 AM  Indications: pain  Details: 25 G needle, anterolateral approach  Medications: 40 mg triamcinolone acetonide 40 mg/mL; 4 mL lidocaine 10 mg/mL (1 %)  Outcome: tolerated well, no immediate complications    Procedure risk factors to include increased pain, bleeding, infection, neurovascular injury, soft tissue injury, transient elevation of blood glucose and blood pressure, and adverse reaction to medication were discussed with the patient. Patient understands there is a moderate risk of morbidity from undergoing the procedure.    Procedure, treatment alternatives, risks and benefits explained, specific risks discussed. Consent was given by the patient. Immediately prior to procedure a time out was called to verify the correct patient, procedure, equipment, support staff and site/side marked as required. Patient was prepped and draped in the usual sterile fashion.

## 2024-04-11 ENCOUNTER — APPOINTMENT (OUTPATIENT)
Dept: ORTHOPEDIC SURGERY | Facility: HOSPITAL | Age: 64
End: 2024-04-11
Payer: COMMERCIAL

## 2024-05-21 ENCOUNTER — HOSPITAL ENCOUNTER (OUTPATIENT)
Dept: RADIOLOGY | Facility: CLINIC | Age: 64
Discharge: HOME | End: 2024-05-21
Payer: COMMERCIAL

## 2024-05-21 DIAGNOSIS — R10.2 PELVIC PAIN: ICD-10-CM

## 2024-05-21 PROCEDURE — 76830 TRANSVAGINAL US NON-OB: CPT | Performed by: RADIOLOGY

## 2024-05-21 PROCEDURE — 76856 US EXAM PELVIC COMPLETE: CPT

## 2024-05-21 PROCEDURE — 76856 US EXAM PELVIC COMPLETE: CPT | Performed by: RADIOLOGY

## 2024-05-29 ENCOUNTER — LAB (OUTPATIENT)
Dept: LAB | Facility: LAB | Age: 64
End: 2024-05-29
Payer: COMMERCIAL

## 2024-05-29 DIAGNOSIS — R10.2 PELVIC PAIN: ICD-10-CM

## 2024-05-29 PROCEDURE — 36415 COLL VENOUS BLD VENIPUNCTURE: CPT

## 2024-05-29 PROCEDURE — 86304 IMMUNOASSAY TUMOR CA 125: CPT

## 2024-05-30 LAB — CANCER AG125 SERPL-ACNC: 8.2 U/ML (ref 0–30.2)

## 2024-06-19 ENCOUNTER — APPOINTMENT (OUTPATIENT)
Dept: DERMATOLOGY | Facility: CLINIC | Age: 64
End: 2024-06-19
Payer: MEDICARE

## 2024-06-19 DIAGNOSIS — L57.8 PHOTOAGING OF SKIN: ICD-10-CM

## 2024-06-19 DIAGNOSIS — Z85.828 PERSONAL HISTORY OF OTHER MALIGNANT NEOPLASM OF SKIN: ICD-10-CM

## 2024-06-19 DIAGNOSIS — D22.5 MELANOCYTIC NEVI OF TRUNK: ICD-10-CM

## 2024-06-19 DIAGNOSIS — Z85.820 PERSONAL HISTORY OF MALIGNANT MELANOMA OF SKIN: ICD-10-CM

## 2024-06-19 DIAGNOSIS — D18.01 HEMANGIOMA OF SKIN: ICD-10-CM

## 2024-06-19 DIAGNOSIS — L82.1 SEBORRHEIC KERATOSIS: ICD-10-CM

## 2024-06-19 DIAGNOSIS — Z12.83 ENCOUNTER FOR SCREENING FOR MALIGNANT NEOPLASM OF SKIN: Primary | ICD-10-CM

## 2024-06-19 DIAGNOSIS — L81.4 LENTIGO: ICD-10-CM

## 2024-06-19 PROCEDURE — 1159F MED LIST DOCD IN RCRD: CPT | Performed by: DERMATOLOGY

## 2024-06-19 PROCEDURE — 99213 OFFICE O/P EST LOW 20 MIN: CPT | Performed by: DERMATOLOGY

## 2024-06-19 NOTE — PROGRESS NOTES
Subjective     Angie Hill is a 64 y.o. female who presents for the following: Skin Check (Right shoulder MM 02/2024 Excision/Right Back BCC ED&C 02/2024).     Review of Systems:  No other skin or systemic complaints other than what is documented elsewhere in the note.    The following portions of the chart were reviewed this encounter and updated as appropriate:         Skin Cancer History  Biopsy Date Type Location Status   12/20/23 BCC, Superficial Right Upper Back Treatment Complete  2/8/24 12/20/23 Other Malignant Neoplasm Right superior shoulder Treatment Complete  2/8/24 2/7/24 Melanoma Right superior shoulder Treatment Complete  2/9/24       Specialty Problems          Dermatology Problems    Other nail disorders        Objective   Well appearing patient in no apparent distress; mood and affect are within normal limits.    A full examination was performed including scalp, head, eyes, ears, nose, lips, neck, chest, axillae, abdomen, back, buttocks, bilateral upper extremities, bilateral lower extremities, hands, feet, fingers, toes, fingernails, and toenails. All findings within normal limits unless otherwise noted below.    Assessment/Plan   1. Encounter for screening for malignant neoplasm of skin  No suspicious lesions noted on examination today     The risk of chronic, cumulative sun damage and risk of development of skin cancer was reviewed today.   The importance of sun protection was reviewed: including the use of a broad spectrum sunscreen that protects against both UVA/UVB rays, with ingredients such as Zinc oxide or titanium dioxide, wearing sun protective clothing and sun avoidance. We reviewed the warning signs of non-melanoma skin cancer and ABCDEs of melanoma  Please follow up should you notice any new or changing pre-existing skin lesion.    Related Procedures  Follow Up In Dermatology - Established Patient  Follow Up In Dermatology - Established Patient    2. Photoaging of  skin  Mottled pigmentation with telangiectasias and brown reticular macules in sun exposed areas of the body.     The risk of chronic, cumulative sun damage and risk of development of skin cancer was reviewed today.   The importance of sun protection was reviewed: including the use of a broad spectrum sunscreen that protects against both UVA/UVB rays, with ingredients such as Zinc oxide or titanium dioxide, wearing sun protective clothing and sun avoidance. We reviewed the warning signs of non-melanoma skin cancer and ABCDEs of melanoma  Please follow up should you notice any new or changing pre-existing skin lesion.    3. Personal history of malignant melanoma of skin  Right Shoulder - Anterior  Well healed scar at site of prior treatment without evidence of recurrence. No lymphadenopathy    There is no evidence of recurrence or repigmentation on clinical examination today, reassure was provided to the patient. The importance of sun protection was reviewed with the patient including the use of a broad spectrum sunscreen that protects against both UVA/UVB rays, with ingredients such as Zinc oxide or titanium dioxide, wearing sun protective clothing and sun avoidance. ABCDEs of melanoma reviewed. Patient to f/u should they notice any new or changing pre-existing skin lesion. The patient was advised to follow up 6 months for FBSE due to history of melanoma.    4. Personal history of other malignant neoplasm of skin  Right Upper Back  Well healed scar that is slightly hypertrophic at site of prior treatment without evidence of recurrence.    There is no evidence of recurrence on clinical examination today, reassurance was provided to the patient. The importance of sun protection was reviewed with the patient including the use of a broad spectrum sunscreen that protects against both UVA/UVB rays, with ingredients such as Zinc oxide or titanium dioxide, wearing sun protective clothing and sun avoidance. Warning signs of  non-melanoma skin cancer were reviewed. ABCDEs of melanoma reviewed. Patient to f/u should they notice any new or changing pre-existing skin lesion    If symptomatic or becomes more hypertrophic can consider intralesional kenalog    5. Lentigo  Scattered tan macules in sun-exposed areas.    These are benign skin lesions due to sun exposure. They will darken in response to sun exposure. They should be monitored for change in size, shape or color.  These lesions can be treated cosmetically with topical creams, liquid nitrogen and a variety of lasers.    6. Hemangioma of skin  Cherry red papules    The benign nature of these skin lesions were reviewed, no treatment is necessary.   Please follow up for any new or pre-existing lesion that is changing in size, shape, color, becomes painful, tender, itches or bleed.    7. Melanocytic nevi of trunk  Tan-brown symmetric macules and papules    Clinically benign appearing nevi, no treatment is necessary.  The importance of sun protection was reviewed: including the use of a broad spectrum sunscreen that protects against both UVA/UVB rays, with ingredients such as Zinc oxide or titanium dioxide, wearing sun protective clothing and sun avoidance.   ABCDEs of melanoma reviewed.  Please follow up should you notice any new or changing pre-existing skin lesion.    8. Seborrheic keratosis (2)  Generalized, Left Palmar Middle 2nd Finger  Brown, tan waxy macules and stuck on appearing papules and plaques    The benign nature of these skin lesions reviewed, reassure provided and no further treatment needed at this time.   These lesions can be removed, if symptomatic (itching, bleeding, rubbing on clothing, painful), otherwise removal is considered cosmetic.       Follow up in 6 months for FBSE or sooner as needed    Simba Barth MD   PGY3, Department of Dermatology    I saw and evaluated the patient. I personally obtained the key and critical portions of the history and physical exam or was  physically present for key and critical portions performed by the resident/fellow. I reviewed the resident/fellow's documentation and discussed the patient with the resident/fellow. I agree with the resident/fellow's medical decision making as documented in the note.    Kate Thompson, DO

## 2024-07-16 ENCOUNTER — LAB (OUTPATIENT)
Dept: LAB | Facility: LAB | Age: 64
End: 2024-07-16
Payer: MEDICARE

## 2024-07-16 DIAGNOSIS — R73.9 HYPERGLYCEMIA: ICD-10-CM

## 2024-07-16 DIAGNOSIS — E78.2 MIXED HYPERLIPIDEMIA: ICD-10-CM

## 2024-07-16 LAB
ALBUMIN SERPL-MCNC: 4.5 G/DL (ref 3.5–5)
ALP BLD-CCNC: 107 U/L (ref 35–125)
ALT SERPL-CCNC: 18 U/L (ref 5–40)
ANION GAP SERPL CALC-SCNC: 13 MMOL/L
AST SERPL-CCNC: 18 U/L (ref 5–40)
BILIRUB SERPL-MCNC: 0.7 MG/DL (ref 0.1–1.2)
BUN SERPL-MCNC: 22 MG/DL (ref 8–25)
CALCIUM SERPL-MCNC: 9.9 MG/DL (ref 8.5–10.4)
CHLORIDE SERPL-SCNC: 106 MMOL/L (ref 97–107)
CHOLEST SERPL-MCNC: 181 MG/DL (ref 133–200)
CHOLEST/HDLC SERPL: 4 {RATIO}
CO2 SERPL-SCNC: 24 MMOL/L (ref 24–31)
CREAT SERPL-MCNC: 1 MG/DL (ref 0.4–1.6)
EGFRCR SERPLBLD CKD-EPI 2021: 63 ML/MIN/1.73M*2
EST. AVERAGE GLUCOSE BLD GHB EST-MCNC: 117 MG/DL
GLUCOSE SERPL-MCNC: 117 MG/DL (ref 65–99)
HBA1C MFR BLD: 5.7 %
HDLC SERPL-MCNC: 45 MG/DL
LDLC SERPL CALC-MCNC: 109 MG/DL (ref 65–130)
POTASSIUM SERPL-SCNC: 4.8 MMOL/L (ref 3.4–5.1)
PROT SERPL-MCNC: 6.6 G/DL (ref 5.9–7.9)
SODIUM SERPL-SCNC: 143 MMOL/L (ref 133–145)
TRIGL SERPL-MCNC: 133 MG/DL (ref 40–150)

## 2024-07-16 PROCEDURE — 80061 LIPID PANEL: CPT

## 2024-07-16 PROCEDURE — 83036 HEMOGLOBIN GLYCOSYLATED A1C: CPT

## 2024-07-16 PROCEDURE — 36415 COLL VENOUS BLD VENIPUNCTURE: CPT

## 2024-07-16 PROCEDURE — 80053 COMPREHEN METABOLIC PANEL: CPT

## 2024-07-18 ENCOUNTER — OFFICE VISIT (OUTPATIENT)
Dept: PRIMARY CARE | Facility: CLINIC | Age: 64
End: 2024-07-18
Payer: MEDICARE

## 2024-07-18 VITALS
HEART RATE: 64 BPM | BODY MASS INDEX: 28.89 KG/M2 | DIASTOLIC BLOOD PRESSURE: 82 MMHG | OXYGEN SATURATION: 96 % | TEMPERATURE: 97.5 F | HEIGHT: 62 IN | SYSTOLIC BLOOD PRESSURE: 138 MMHG | WEIGHT: 157 LBS

## 2024-07-18 DIAGNOSIS — Z23 ENCOUNTER FOR IMMUNIZATION: ICD-10-CM

## 2024-07-18 DIAGNOSIS — E78.2 MIXED HYPERLIPIDEMIA: ICD-10-CM

## 2024-07-18 DIAGNOSIS — F41.9 ANXIETY: ICD-10-CM

## 2024-07-18 DIAGNOSIS — I10 ESSENTIAL HYPERTENSION: ICD-10-CM

## 2024-07-18 DIAGNOSIS — R73.01 IFG (IMPAIRED FASTING GLUCOSE): ICD-10-CM

## 2024-07-18 DIAGNOSIS — K22.719 BARRETT'S ESOPHAGUS WITH DYSPLASIA: ICD-10-CM

## 2024-07-18 DIAGNOSIS — Z13.820 SCREENING FOR OSTEOPOROSIS: ICD-10-CM

## 2024-07-18 DIAGNOSIS — K21.9 GASTROESOPHAGEAL REFLUX DISEASE, UNSPECIFIED WHETHER ESOPHAGITIS PRESENT: ICD-10-CM

## 2024-07-18 DIAGNOSIS — Z78.0 POSTMENOPAUSAL STATE: ICD-10-CM

## 2024-07-18 DIAGNOSIS — Z00.00 ROUTINE ADULT HEALTH MAINTENANCE: Primary | ICD-10-CM

## 2024-07-18 DIAGNOSIS — E88.819 INSULIN RESISTANCE: ICD-10-CM

## 2024-07-18 PROCEDURE — G0438 PPPS, INITIAL VISIT: HCPCS | Performed by: FAMILY MEDICINE

## 2024-07-18 PROCEDURE — 1160F RVW MEDS BY RX/DR IN RCRD: CPT | Performed by: FAMILY MEDICINE

## 2024-07-18 PROCEDURE — 3079F DIAST BP 80-89 MM HG: CPT | Performed by: FAMILY MEDICINE

## 2024-07-18 PROCEDURE — 3075F SYST BP GE 130 - 139MM HG: CPT | Performed by: FAMILY MEDICINE

## 2024-07-18 PROCEDURE — 1123F ACP DISCUSS/DSCN MKR DOCD: CPT | Performed by: FAMILY MEDICINE

## 2024-07-18 PROCEDURE — 90677 PCV20 VACCINE IM: CPT | Performed by: FAMILY MEDICINE

## 2024-07-18 PROCEDURE — 1158F ADVNC CARE PLAN TLK DOCD: CPT | Performed by: FAMILY MEDICINE

## 2024-07-18 PROCEDURE — 1159F MED LIST DOCD IN RCRD: CPT | Performed by: FAMILY MEDICINE

## 2024-07-18 PROCEDURE — 1126F AMNT PAIN NOTED NONE PRSNT: CPT | Performed by: FAMILY MEDICINE

## 2024-07-18 PROCEDURE — 1036F TOBACCO NON-USER: CPT | Performed by: FAMILY MEDICINE

## 2024-07-18 PROCEDURE — 3008F BODY MASS INDEX DOCD: CPT | Performed by: FAMILY MEDICINE

## 2024-07-18 PROCEDURE — 99215 OFFICE O/P EST HI 40 MIN: CPT | Performed by: FAMILY MEDICINE

## 2024-07-18 RX ORDER — UBIDECARENONE 30 MG
30 CAPSULE ORAL DAILY
COMMUNITY

## 2024-07-18 RX ORDER — LOSARTAN POTASSIUM 100 MG/1
100 TABLET ORAL DAILY
Qty: 90 TABLET | Refills: 1 | Status: SHIPPED | OUTPATIENT
Start: 2024-07-18 | End: 2025-01-14

## 2024-07-18 RX ORDER — AMLODIPINE BESYLATE 5 MG/1
5 TABLET ORAL DAILY
Qty: 90 TABLET | Refills: 1 | Status: SHIPPED | OUTPATIENT
Start: 2024-07-18 | End: 2025-01-14

## 2024-07-18 RX ORDER — LORAZEPAM 0.5 MG/1
0.5 TABLET ORAL EVERY 6 HOURS PRN
Qty: 28 TABLET | Refills: 0 | Status: SHIPPED | OUTPATIENT
Start: 2024-07-18 | End: 2024-07-25

## 2024-07-18 RX ORDER — PANTOPRAZOLE SODIUM 40 MG/1
40 TABLET, DELAYED RELEASE ORAL
Qty: 90 TABLET | Refills: 1 | Status: SHIPPED | OUTPATIENT
Start: 2024-07-18 | End: 2025-01-14

## 2024-07-18 RX ORDER — METFORMIN HYDROCHLORIDE 500 MG/1
500 TABLET ORAL
Qty: 90 TABLET | Refills: 1 | Status: SHIPPED | OUTPATIENT
Start: 2024-07-18 | End: 2025-01-14

## 2024-07-18 ASSESSMENT — PATIENT HEALTH QUESTIONNAIRE - PHQ9
2. FEELING DOWN, DEPRESSED OR HOPELESS: NOT AT ALL
SUM OF ALL RESPONSES TO PHQ9 QUESTIONS 1 AND 2: 0
1. LITTLE INTEREST OR PLEASURE IN DOING THINGS: NOT AT ALL

## 2024-07-18 ASSESSMENT — PAIN SCALES - GENERAL: PAINLEVEL: 0-NO PAIN

## 2024-07-18 NOTE — PROGRESS NOTES
Outpatient Visit Note    Chief Complaint   Patient presents with    Annual Exam       HPI:  Angie Hill is a 64 y.o. female a past medical history significant for hypertension, GERD, anxiety and bilateral knee pain who presents to the office for an annual Medicare Wellness Visit.  She was last seen in the office on 1/18/2024 for follow-up.      Of note, at prior encounter in October she noted persistent left flank pain for the last several months.  Had a history of sciatica to which she has been actively working with chiropractic medicine.  Was able to help with low back/sciatic issues though flank discomfort had continued.  Denied any urinary changes, hematuria, urgency or frequency.  Blood work was completed on 1/12/2024 including CBC, CMP, lipid panel TSH which was notable for hyperglycemia and hyperlipidemia.  Urinalysis was completed at that time which showed positive leukocytes and bacteria on microscopy.  Urine culture was not collected though patient did give recent sample with results showing no growth.    At last encounter she stated to have continued elements of intermittent low back/flank pain.  Symptoms did flareup significantly approximately 2 weeks prior prompting her to get her test results done.  Additionally noted history of irritable bowel symptoms with diverticular disease and occasional diverticulitis flares.  Expressed concerns of possible diverticular flare but denies systemic complaint such as fever, chills or significant bowel changes.  Has traditionally used as needed Bentyl with prescription refill request at this time.  Was ultimately given refill on Bentyl and treated for possible diverticulitis    Had been previously established with primary care providers at [facility], in which she was in the process of moving to the area at last encounter.  Had been last seen by her former PCP for routine follow-up in February at which time blood work was completed including CBC,  CMP lipid panel. Blood work was remarkable for hyperglycemia and mildly elevated LDL. Beyond her primary care provider, she does have established relationship with GI to whom she last saw in May for repeat endoscopy. States the GERD has been stable on pantoprazole.    Last panel blood work recently completed on 7/16/2024 including A1c, CMP and lipid panel.  Blood work was remarkable for prediabetic A1c of 5.7% which had improved from 5.9% 2 years ago.  This was consistent with elevated fasting blood glucose levels.  Kidney function did show decline from prior panel 6 months ago though has remained in same CKD stage II range.  Cholesterol panel did show improvement in regards to total cholesterol and triglycerides.    Well Exam:  Overall, they describes their health as good with no reports of recent illness or hospitalization. They state that their diet is stable. In regards to physical activity, she remains very active, going to the [place] regularly. They deny any significant sleep complaints. No reported issues of chest pain, shortness of breath, headaches, vision/hearing changes, abdominal pain, vomiting, diarrhea, melena, hematochezia, constipation or urinary symptoms.    Preventative Health Maintenance:  In regards to preventative health maintenance, last Tdap received in 2012 per state record. Flu shot received for past season. Pneumonia vaccination series not previously received. In regards to CRC screening, colonoscopy successfully completed in 2021. Shingles vaccination series completed. COVID-19 vaccination series completed with patient currently up-to-date with most recent booster. Last Mammogram in January 2024. DEXA scan not previously completed.    Hypertension:  Has historically manage her high blood pressure with losartan 100 mg and amlodipine 5 mg daily which has been well-tolerated without adverse side effects.  Denies any chest pain, shortness a breath, lightheadedness or dizziness.  Has had recent  stressors related to moving.    States that stress level has significantly improved since last visit as she has been able to settle more in her home.  Still has increased stressors with her sister undergoing treatment for ovarian cancer.  Denies recent anxiety attacks or other symptomatic complaints such as major depressive episodes, anhedonia, SI/HI.  Is requesting prescription refill on her as needed benzodiazepine which continues to be effective.    Does have personal goal to lose weight over the next several months.  Has historically struggled with known insulin resistance.  Does state to have been seen by endocrinology in the past with multiple medication trials attempted.  Did have some success with metformin though GI upset was prominent.  Is interested in retrying a low-dose regimen with request for prescription.      Advanced directives: Living will/power of     Hearing screen: reports no difficulty with hearing and passes finger rub test bilaterally    Does the patient use opioid medications: No  Name of medication: N/A  If yes, do they take this medicine appropriately: N/A    How does the patient rate their health status today: Good    Cognitive Screen:  AAAx3  to person, place and time: Yes  3 word recall: Apple, Car, Shoe - Immediate recall: Yes         - 5 minutes recall: Yes  Impression: No cognitive deficiency observed during screening or encounter today      Reviewed:   Past Medical History/Allergies:  Yes  Family History:  Yes  Social History:  Yes  Current Medications:  Yes  Vital Signs:  Yes  Advanced Directives:  discussed  Immunizations:  reviewed today  Home Safety:                    Up & Go test > 30 seconds?  No                   Home have rugs; lack grab bars in bathroom; lack handrail on stairs; have poor lighting?  No                   Hearing difficulties?  No  Geriatric Assessment                   ADL areas requiring assistance:  Does not need help with Dressing, Eating,  Ambulating, Toileting, Grooming, Hygiene.                    IADL areas requiring assistance:  Does not need help with Shopping, Housework, Accounting, Transportation, Driving.   Medications: reviewed  Current supplements:  Reviewed and recorded.   Other providers: Reviewed and recorded - Current providers and suppliers: Dr. Tyler. Dr. Coe -OB/GYN; Dr. Campa -orthopedics; Dr. Thompson-dermatology      Past Medical History:   Diagnosis Date    Anxiety     GERD (gastroesophageal reflux disease)     Hypertension     Personal history of other mental and behavioral disorders 08/08/2022    History of anxiety    Skin cancer (melanoma) (Multi) 12/2023        Current Medications  Current Outpatient Medications   Medication Instructions    amLODIPine (NORVASC) 5 mg, oral, Daily    co-enzyme Q-10 30 mg, oral, Daily    LORazepam (ATIVAN) 0.5 mg, oral, Every 6 hours PRN    losartan (COZAAR) 100 mg, oral, Daily    meloxicam (MOBIC) 15 mg, oral, Daily PRN    metFORMIN (GLUCOPHAGE) 500 mg, oral, Daily with evening meal    multivitamin tablet TAKE 1 TABLET DAILY.           OTC    pantoprazole (PROTONIX) 40 mg, oral, Daily before breakfast, Do not crush, chew, or split.        Allergies  Allergies   Allergen Reactions    Tetracyclines GI Upset    Penicillins Rash        Immunizations  Immunization History   Administered Date(s) Administered    Flu vaccine (IIV4), preservative free *Check age/dose* 09/03/2020, 09/24/2021, 09/09/2022    Flu vaccine, quadrivalent, no egg protein, age 6 month or greater (FLUCELVAX) 09/09/2022, 09/13/2023    Influenza, Unspecified 09/28/2016    Influenza, seasonal, injectable 09/28/2016, 10/12/2023, 10/12/2023    Moderna SARS-CoV-2 Vaccination 02/02/2021, 03/04/2021, 11/23/2021    Novel influenza-H1N1-09, preservative-free 10/16/2009    Pfizer COVID-19 vaccine, Fall 2023, 12 years and older, (30mcg/0.3mL) 10/12/2023    Pfizer COVID-19 vaccine, bivalent, age 12 years and older (30 mcg/0.3 mL) 09/09/2022     Pneumococcal conjugate vaccine, 20-valent (PREVNAR 20) 2024    Tdap vaccine, age 7 year and older (BOOSTRIX, ADACEL) 2012    Zoster vaccine, recombinant, adult (SHINGRIX) 2024, 2024    Zoster, live 2024        Past Surgical History:   Procedure Laterality Date    BREAST CYST ASPIRATION Right     Benign     SECTION, CLASSIC  2016     Section    LAPAROSCOPY DIAGNOSTIC / BIOPSY / ASPIRATION / LYSIS  2016    Exploratory Laparoscopy    OTHER SURGICAL HISTORY  10/12/2021    Knee surgery     Family History   Problem Relation Name Age of Onset    Heart disease Mother      Hypertension Mother      Lung disease Father      Ovarian cancer Sister  60     Social History     Tobacco Use    Smoking status: Never    Smokeless tobacco: Never   Vaping Use    Vaping status: Never Used   Substance Use Topics    Alcohol use: Not Currently    Drug use: Never     Tobacco Use: Low Risk  (2024)    Patient History     Smoking Tobacco Use: Never     Smokeless Tobacco Use: Never     Passive Exposure: Not on file        ROS  All pertinent positive symptoms are included in the history of present illness.  All other systems have been reviewed and are negative and noncontributory to this patient's current ailments.    VITAL SIGNS  Vitals:    24 1451   BP: 138/82   Pulse: 64   Temp: 36.4 °C (97.5 °F)   SpO2: 96%     Vitals:    24 1451   Weight: 71.2 kg (157 lb)      Body mass index is 28.72 kg/m².     PHYSICAL EXAM  GENERAL APPEARANCE: well nourished, well developed, looks like stated age, in no acute distress, not ill or tired appearing, conversing well.   HEENT: no trauma, normocephalic. PERRLA and EOMI with normal external exam. TM's intact with no injection or effusion, no signs of infection. Nares patent, turbinates pink without discharge. Pharynx pink with no exudates or lesions, no enlarged tonsils.   NECK: no nodes, supple without rigidity, no neck mass was  observed, no thyromegaly or thyroid nodules.   HEART: regular rate and rhythm, S1 and S2 heard with no murmurs or skipped beats  LUNGS: clear to auscultation bilaterally with no wheezes, crackles or rales.   ABDOMEN: no organomegaly, soft, nontender, nondistended, no guarding/rebound/rigidity.   EXTREMITIES: moving all extremities equally with no edema or deformities.   SKIN: normal skin color and pigmentation, normal skin turgor without rash, lesions, or nodules visualized.   NEUROLOGIC EXAM: CN II-XII grossly intact, normal gait, normal balance, 5/5 muscle strength, sensation grossly intact.   PSYCH: mood and affect appropriate; alert and oriented to time, place, person; no difficulty with speech or language.     Preventative Services reviewed with patient and copy printed for patient.    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     - Will continue with pantoprazole regimen and moderation of trigger foods         Essential hypertension I10     - Blood pressure stable on current regimen  -Continue to focus on healthy, balanced diet with moderation of salt/caffeine/alcohol and moderation of stress         Relevant Medications    amLODIPine (Norvasc) 5 mg tablet    losartan (Cozaar) 100 mg tablet    Gastroesophageal reflux disease K21.9    Relevant Medications    pantoprazole (ProtoNix) 40 mg EC tablet    IFG (impaired fasting glucose) R73.01     - Continue to focus on healthy, low-fat diet with moderation of carbohydrates/sugars         Relevant Medications    metFORMIN (Glucophage) 500 mg tablet    Anxiety F41.9     - Will continue with as needed anxiety medication sparingly         Relevant Medications    LORazepam (Ativan) 0.5 mg tablet    Mixed hyperlipidemia E78.2     - Recent cholesterol levels did significantly improve  -Will continue with healthy low-fat diet and moderation of carbohydrates         Insulin resistance E88.819     - With  historic insulin resistance, will attempt metformin trial with prescription sent to pharmacy  -Continue to focus on healthy, low-fat diet with moderation of carbohydrates         Relevant Medications    metFORMIN (Glucophage) 500 mg tablet     Other Visit Diagnoses         Codes    Routine adult health maintenance    -  Primary Z00.00    Relevant Medications    metFORMIN (Glucophage) 500 mg tablet    Other Relevant Orders    XR DEXA bone density    Pneumococcal conjugate vaccine, 20-valent (PREVNAR 20) (Completed)    Encounter for immunization     Z23    Relevant Orders    Pneumococcal conjugate vaccine, 20-valent (PREVNAR 20) (Completed)    Postmenopausal state     Z78.0    Relevant Orders    XR DEXA bone density    Screening for osteoporosis     Z13.820    Relevant Orders    XR DEXA bone density            Additional Visit Plans:  Notes:  PREVENTATIVE HEALTH SCREENINGS INCLUDED:  - Blood pressure screen.  - Blood work may include a cholesterol and diabetes screen if risk factors exist (overweight, high blood pressure etc); screening for sexually transmitted infections; a one time HIV screen for all individuals, and a one time Hepatitis C Virus screen for those born between 9759-3071.  - I encourage you to eat a low-fat, moderate-carbohydrate, low-calorie diet to maintain a normal BMI (under 25) to reduce heart disease, and risk for diabetes  - Moderate intensity exercise for 30 minutes 5 days per week is recommended  - Along with recommendations for nutrition and exercise discussed today helpful resources recommended by the American Academy of Family Practice can be found at www.familydoctor.org or www.choosemyplate.gov    - Colon cancer screening for all ages 45-75 or 85 years old periodically depending on results.  - Cervical cancer screening (pap test) in women between 21-65 years old, periodically depending on results.  - Mammogram screening for breast cancer in women starting at 40-50 years and every 1-2  years.  - For men and women who have a 30 pack year smoking history and currently smoke or have quit in the past 15 years, screening for lung cancer with a yearly low dose CT scan is recommended starting at age 55 until age 80 years  - For men only who have smoked 100+ cigarettes anytime in their lifetime, a one time ultrasound screening for for abdominal aortic aneurysms starting at age 65 until 75 years old  - Bone density screening (DEXA) for osteoporosis in women aged 65 years and older, once every two years if needed.    IMMUNIZATIONS:  - Flu shot annually.  - Tetanus booster every 10 years.  - Two pneumococcal vaccinations after 65 years old.  - Shingles vaccine for those 50 years or older.     This was a shared decision making visit.    Next Wellness Exam Due  In 1 year from today    Counseling:       Medication education:         Education:  The patient is counseled regarding potential side-effects of all new medications        Understanding:  Patient expressed understanding        Adherence:  No barriers to adherence identified    ** Please excuse any errors in grammar or translation related to this dictation. Voice recognition software was utilized to prepare this document. **

## 2024-07-18 NOTE — ASSESSMENT & PLAN NOTE
- With historic insulin resistance, will attempt metformin trial with prescription sent to pharmacy  -Continue to focus on healthy, low-fat diet with moderation of carbohydrates

## 2024-07-18 NOTE — ASSESSMENT & PLAN NOTE
- Recent cholesterol levels did significantly improve  -Will continue with healthy low-fat diet and moderation of carbohydrates

## 2024-07-18 NOTE — PATIENT INSTRUCTIONS
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     - Will continue with pantoprazole regimen and moderation of trigger foods         Essential hypertension I10     - Blood pressure stable on current regimen  -Continue to focus on healthy, balanced diet with moderation of salt/caffeine/alcohol and moderation of stress         Relevant Medications    amLODIPine (Norvasc) 5 mg tablet    losartan (Cozaar) 100 mg tablet    Gastroesophageal reflux disease K21.9    Relevant Medications    pantoprazole (ProtoNix) 40 mg EC tablet    IFG (impaired fasting glucose) R73.01     - Continue to focus on healthy, low-fat diet with moderation of carbohydrates/sugars         Relevant Medications    metFORMIN (Glucophage) 500 mg tablet    Anxiety F41.9     - Will continue with as needed anxiety medication sparingly         Relevant Medications    LORazepam (Ativan) 0.5 mg tablet    Mixed hyperlipidemia E78.2     - Recent cholesterol levels did significantly improve  -Will continue with healthy low-fat diet and moderation of carbohydrates         Insulin resistance E88.819     - With historic insulin resistance, will attempt metformin trial with prescription sent to pharmacy  -Continue to focus on healthy, low-fat diet with moderation of carbohydrates         Relevant Medications    metFORMIN (Glucophage) 500 mg tablet     Other Visit Diagnoses         Codes    Routine adult health maintenance    -  Primary Z00.00    Relevant Medications    metFORMIN (Glucophage) 500 mg tablet    Other Relevant Orders    XR DEXA bone density    Pneumococcal conjugate vaccine, 20-valent (PREVNAR 20)    Encounter for immunization     Z23    Relevant Orders    Pneumococcal conjugate vaccine, 20-valent (PREVNAR 20)    Postmenopausal state     Z78.0    Relevant Orders    XR DEXA bone density    Screening for osteoporosis     Z13.820    Relevant Orders    XR DEXA bone density            Additional Visit  Plans:  Notes:  PREVENTATIVE HEALTH SCREENINGS INCLUDED:  - Blood pressure screen.  - Blood work may include a cholesterol and diabetes screen if risk factors exist (overweight, high blood pressure etc); screening for sexually transmitted infections; a one time HIV screen for all individuals, and a one time Hepatitis C Virus screen for those born between 9914-5702.  - I encourage you to eat a low-fat, moderate-carbohydrate, low-calorie diet to maintain a normal BMI (under 25) to reduce heart disease, and risk for diabetes  - Moderate intensity exercise for 30 minutes 5 days per week is recommended  - Along with recommendations for nutrition and exercise discussed today helpful resources recommended by the American Academy of Family Practice can be found at www.familydoctor.org or www.choosemyplate.gov    - Colon cancer screening for all ages 45-75 or 85 years old periodically depending on results.  - Cervical cancer screening (pap test) in women between 21-65 years old, periodically depending on results.  - Mammogram screening for breast cancer in women starting at 40-50 years and every 1-2 years.  - For men and women who have a 30 pack year smoking history and currently smoke or have quit in the past 15 years, screening for lung cancer with a yearly low dose CT scan is recommended starting at age 55 until age 80 years  - For men only who have smoked 100+ cigarettes anytime in their lifetime, a one time ultrasound screening for for abdominal aortic aneurysms starting at age 65 until 75 years old  - Bone density screening (DEXA) for osteoporosis in women aged 65 years and older, once every two years if needed.    IMMUNIZATIONS:  - Flu shot annually.  - Tetanus booster every 10 years.  - Two pneumococcal vaccinations after 65 years old.  - Shingles vaccine for those 50 years or older.     This was a shared decision making visit.    Next Wellness Exam Due  In 1 year from today    Counseling:       Medication education:          Education:  The patient is counseled regarding potential side-effects of all new medications        Understanding:  Patient expressed understanding        Adherence:  No barriers to adherence identified    ** Please excuse any errors in grammar or translation related to this dictation. Voice recognition software was utilized to prepare this document. **

## 2024-07-22 ENCOUNTER — HOSPITAL ENCOUNTER (OUTPATIENT)
Dept: RADIOLOGY | Facility: CLINIC | Age: 64
Discharge: HOME | End: 2024-07-22
Payer: MEDICARE

## 2024-07-22 ENCOUNTER — APPOINTMENT (OUTPATIENT)
Dept: PRIMARY CARE | Facility: CLINIC | Age: 64
End: 2024-07-22
Payer: MEDICARE

## 2024-07-22 DIAGNOSIS — Z78.0 POSTMENOPAUSAL STATE: ICD-10-CM

## 2024-07-22 DIAGNOSIS — Z00.00 ROUTINE ADULT HEALTH MAINTENANCE: ICD-10-CM

## 2024-07-22 DIAGNOSIS — Z13.820 SCREENING FOR OSTEOPOROSIS: ICD-10-CM

## 2024-07-22 PROCEDURE — 77080 DXA BONE DENSITY AXIAL: CPT

## 2024-07-22 PROCEDURE — 77080 DXA BONE DENSITY AXIAL: CPT | Performed by: RADIOLOGY

## 2024-07-22 ASSESSMENT — LIFESTYLE VARIABLES
CURRENT_SMOKER: N
3_OR_MORE_DRINKS_PER_DAY: N

## 2024-09-04 ENCOUNTER — APPOINTMENT (OUTPATIENT)
Dept: ORTHOPEDIC SURGERY | Facility: CLINIC | Age: 64
End: 2024-09-04
Payer: MEDICARE

## 2024-09-04 DIAGNOSIS — M17.12 PRIMARY OSTEOARTHRITIS OF LEFT KNEE: ICD-10-CM

## 2024-09-04 PROCEDURE — 20610 DRAIN/INJ JOINT/BURSA W/O US: CPT | Performed by: FAMILY MEDICINE

## 2024-09-04 PROCEDURE — 99213 OFFICE O/P EST LOW 20 MIN: CPT | Performed by: FAMILY MEDICINE

## 2024-09-04 RX ORDER — TRIAMCINOLONE ACETONIDE 40 MG/ML
40 INJECTION, SUSPENSION INTRA-ARTICULAR; INTRAMUSCULAR
Status: COMPLETED | OUTPATIENT
Start: 2024-09-04 | End: 2024-09-04

## 2024-09-04 RX ORDER — LIDOCAINE HYDROCHLORIDE 10 MG/ML
4 INJECTION INFILTRATION; PERINEURAL
Status: COMPLETED | OUTPATIENT
Start: 2024-09-04 | End: 2024-09-04

## 2024-09-04 RX ORDER — MELOXICAM 15 MG/1
15 TABLET ORAL DAILY PRN
Qty: 90 TABLET | Refills: 0 | Status: SHIPPED | OUTPATIENT
Start: 2024-09-04

## 2024-09-04 NOTE — PROGRESS NOTES
** Please excuse any errors in grammar or translation related to this dictation. Voice recognition software was utilized to prepare this document. **    Assessment & Plan:  Patient here for ongoing management of left knee arthritis. Given previous positive response to steroid injection, offered to repeat today which patient was agreeable to have completed.   Non-operative treatment options reviewed below.  - Maintaining a healthy weight: Every pound of bodyweight is about 4-5 pounds through the lower extremity.   - Exercise program to strengthen supportive musculature.    - Activity modifications as needed to include use of cane/walker or braces.  - Prescription and otc analgesics to include but not limited to APAP, ibuprofen, naproxen, diclofenac gel. Renewed rx for meloxicam. Advise to use as needed for short durations to reduce intermittent pain flares.   - Steroid injection.   - Hyaluronic acid injection. Synvisc-One in October 2023 helped immensely; can repeat in future at patient's request.   - Referral to pain management for potential nerve ablation.  - After failing non-operative measures, may ultimately require arthroplasty.  Informed patient that steroid injection can be repeated every 3 or more months as symptoms dictate.  All questions answered and patient is agreeable to this plan.       Chief complaint: left knee pain    HPI:  9/4/24: Patient reports the injection on 4/3 provided about 5 months relief.  It is intermittently achy.  She is planning on doing a lot of hiking soon. Meloxicam as needed gives her relief.     4/3/24: Patient reports Synvisc-One injection from 10/26 is still providing her with some relief.  She is starting to have occasional achiness in the left knee and would like to supplement it with a cortisone injection.  Denies any new injuries.     10/26/23: Patient presents for left knee Synvisc-One injection.  Reports she responded well to steroid injection is having minimal symptoms  at this time.      8/31/23: 63-year-old female history of left knee arthritis referred by Dr. Kamara for hyaluronic acid injection consult.  Patient reports ongoing left knee pain for a little over 1 year. She reports previous treatment with steroid injection followed by hyaluronic acid injection. The Synvisc-1 injection was completed in October 2022. Reports this gave her approximately 5 to 6 months of pain relief. She recently follow-up with Dr. Kamara to discuss ongoing treatments of this condition. She was informed that due to the underlying arthritis in her knee that she would not be a candidate for arthroscopic surgery. If wanted to pursue surgical options it would be for total joint replacement. She feels that her symptoms are not severe and like to continue nonoperative management at this time.     Exam:  Left knee examined. No effusion, ecchymosis, or erythema. AROM from 0 to 130 deg with 5/5 strength. No joint line tenderness. No ttp with patellar compression.     General Exam:  Constitutional - NAD, AAO x 3, conversing appropriately.  HEENT- Normocephalic and atraumatic. EOMI, PERRLA, No scleral icterus. No facial deformities. Hearing grossly normal.  Lungs - Breathing non-labored with normal rate. No accessory muscle use.  CV - Extremities warm and well-perfused, brisk capillary refill present.   Neuro - CN II-XII grossly intact.    Results:  X-rays of left knee obtained 7/14/2022 : mild degenerative changes.  MRI of left knee obtained 8/18/2022 : moderate medial compartment degenerative changes.      Procedure:  Patient ID: Angie Hill is a 64 y.o. female.    L Inj/Asp: L knee on 9/4/2024 1:13 PM  Indications: pain  Details: 25 G needle, anterolateral approach  Medications: 40 mg triamcinolone acetonide 40 mg/mL; 4 mL lidocaine 10 mg/mL (1 %)  Outcome: tolerated well, no immediate complications    Procedure risk factors to include increased pain, bleeding, infection, neurovascular injury, soft tissue  injury, transient elevation of blood glucose and blood pressure, and adverse reaction to medication were discussed with the patient. Patient understands there is a moderate risk of morbidity from undergoing the procedure.    Procedure, treatment alternatives, risks and benefits explained, specific risks discussed. Consent was given by the patient. Immediately prior to procedure a time out was called to verify the correct patient, procedure, equipment, support staff and site/side marked as required. Patient was prepped and draped in the usual sterile fashion.

## 2024-11-11 ENCOUNTER — APPOINTMENT (OUTPATIENT)
Dept: OBSTETRICS AND GYNECOLOGY | Facility: CLINIC | Age: 64
End: 2024-11-11
Payer: COMMERCIAL

## 2024-11-11 VITALS
BODY MASS INDEX: 28.47 KG/M2 | WEIGHT: 155.65 LBS | DIASTOLIC BLOOD PRESSURE: 80 MMHG | SYSTOLIC BLOOD PRESSURE: 120 MMHG

## 2024-11-11 DIAGNOSIS — Z12.31 SCREENING MAMMOGRAM FOR BREAST CANCER: ICD-10-CM

## 2024-11-11 PROCEDURE — 1036F TOBACCO NON-USER: CPT | Performed by: NURSE PRACTITIONER

## 2024-11-11 PROCEDURE — 3079F DIAST BP 80-89 MM HG: CPT | Performed by: NURSE PRACTITIONER

## 2024-11-11 PROCEDURE — 3074F SYST BP LT 130 MM HG: CPT | Performed by: NURSE PRACTITIONER

## 2024-11-11 PROCEDURE — 1160F RVW MEDS BY RX/DR IN RCRD: CPT | Performed by: NURSE PRACTITIONER

## 2024-11-11 PROCEDURE — G0101 CA SCREEN;PELVIC/BREAST EXAM: HCPCS | Performed by: NURSE PRACTITIONER

## 2024-11-11 PROCEDURE — 1159F MED LIST DOCD IN RCRD: CPT | Performed by: NURSE PRACTITIONER

## 2024-11-11 NOTE — PROGRESS NOTES
HPI:   Angie Hill is a 64 y.o. who presents today for her annual gynecologic exam without complaints    She has the following concerns; None. She is doing well. Had some pelvic pain, off and on with some associated urinary frequency. Took an at home urine test but it did not show any infection. She denies any urinary frequency, pain or burning today. She denies any epigastric pain, no postmenopausal bleeding or abnormal vaginal discharge. She has a hx of endometriosis. She has a first degree relative (her sister) with a hx of ovarian cancer. She had a Ca-125 drawn on 5/31/24 that was 8.2, She also had a pelvic ultrasound for pelvic pain in July 2024 that showed a small fibroid in her uterus measuring 6 mm, right and left ovary were both normal.       GYN HISTORY:  Denies any postmenopausal bleeding.     PAP History   Last pap:   2023 Normal HPV Negative  History of abnormal pap: no    Health Screening  Family history of breast, uterine, ovarian or colon cancer: yes - her sister has a hx of ovarian cancer.     Breast cancer: mammogram - and breast MRI done in January 2024. Hx of dense breast tissue.     Colon cancer: up to date. Done in the last three years.     DEXA scan- done in 2024; normal bone density.   The patient feels safe at home.         Review of Systems:   Constitutional: no fever and no chills.  Cardiovascular: no chest pain.   Respiratory: no shortness of breath.   Gastrointestinal: no nausea, no abdominal pain and no constipation  Genitourinary: no dysuria, no urinary incontinence, no vaginal dryness, no pelvic pain and no vaginal discharge.   Neurological: no headache.  Psychiatric: no anxiety and no depression.              Objective         /80   Wt 70.6 kg (155 lb 10.3 oz)   BMI 28.47 kg/m²         Physical Exam:   Constitutional: Alert and in no acute distress. Well developed, well nourished.      Neck: No neck asymmetry. Supple. Thyroid not enlarged and there were no  palpable thyroid nodules.      Cardiovascular: Heart rate and rhythm were normal, normal S1 and S2, no gallops, and no murmurs.      Pulmonary: No respiratory distress. Clear bilateral breath sounds.      Chest: Breasts: Normal appearance, no nipple discharge and no skin changes. Palpation of breasts and axillae: No palpable mass and no axillary lymphadenopathy.      Abdomen: Soft nontender; no abdominal mass palpated. Normal bowel sounds. No organomegaly.      Genitourinary:   - External genitalia: Normal.   - Palpation of lymph nodes in groin: No inguinal lymphadenopathy.   - Bartholin's Urethral and Skenes Glands: Normal.   - Urethra: Normal.    -Bladder: Normal on palpation.   - Vagina: Normal.   - Cervix: Normal.   - Uterus: Normal. Right Adnexa/parametria: Normal. Left Adnexa/parametria: Normal.   - Perianal Area: Normal.      Skin: Normal skin color and pigmentation, normal skin turgor, and no rash     Psychiatric: Alert and oriented x 3. Affect normal to patient baseline. Mood: Appropriate.            Assessment/Plan       Diagnoses and all orders for this visit:  Encounter for well woman exam with routine gynecological exam  Here for well woman exam. She is doing well. 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 Recent CA-125 and pelvic ultrasound done in 2024. Ultrasound showed a small fibroid. She desires to have a consultation for possible hysterectomy. Pt will follow-up with the physicians.   Screening mammogram for breast cancer  -     BI mammo bilateral screening tomosynthesis; Future  Follow-up with physicians for surgical consult. Follow-up annually; sooner if needed.           Radha Coe, KEYUR-CNP

## 2025-01-02 ENCOUNTER — APPOINTMENT (OUTPATIENT)
Dept: DERMATOLOGY | Facility: CLINIC | Age: 65
End: 2025-01-02
Payer: COMMERCIAL

## 2025-01-08 ENCOUNTER — APPOINTMENT (OUTPATIENT)
Dept: DERMATOLOGY | Facility: CLINIC | Age: 65
End: 2025-01-08
Payer: MEDICARE

## 2025-01-08 DIAGNOSIS — L82.1 SEBORRHEIC KERATOSIS: ICD-10-CM

## 2025-01-08 DIAGNOSIS — Z12.83 ENCOUNTER FOR SCREENING FOR MALIGNANT NEOPLASM OF SKIN: ICD-10-CM

## 2025-01-08 DIAGNOSIS — Z85.828 PERSONAL HISTORY OF OTHER MALIGNANT NEOPLASM OF SKIN: ICD-10-CM

## 2025-01-08 DIAGNOSIS — D18.01 HEMANGIOMA OF SKIN: ICD-10-CM

## 2025-01-08 DIAGNOSIS — L30.0 NUMMULAR DERMATITIS: ICD-10-CM

## 2025-01-08 DIAGNOSIS — L81.4 LENTIGO: ICD-10-CM

## 2025-01-08 DIAGNOSIS — Z85.820 PERSONAL HISTORY OF MALIGNANT MELANOMA OF SKIN: ICD-10-CM

## 2025-01-08 DIAGNOSIS — L57.8 PHOTOAGING OF SKIN: Primary | ICD-10-CM

## 2025-01-08 DIAGNOSIS — D22.5 MELANOCYTIC NEVI OF TRUNK: ICD-10-CM

## 2025-01-08 PROCEDURE — 99213 OFFICE O/P EST LOW 20 MIN: CPT | Performed by: DERMATOLOGY

## 2025-01-08 PROCEDURE — 1159F MED LIST DOCD IN RCRD: CPT | Performed by: DERMATOLOGY

## 2025-01-08 NOTE — PROGRESS NOTES
Subjective     Angie Hill is a 64 y.o. female who presents for the following: Skin Check (6 months - LV 06/19/24 - Patient has a history of: MMis - 12/20/23, BCC - 12/20/23, SK.  Patient states no areas of concern/).     Review of Systems:  No other skin or systemic complaints other than what is documented elsewhere in the note.    The following portions of the chart were reviewed this encounter and updated as appropriate:         Skin Cancer History  Biopsy Date Type Location Status   12/20/23 BCC, Superficial Right Upper Back Treatment Complete  2/8/24 12/20/23 Other Malignant Neoplasm Right superior shoulder Treatment Complete  2/8/24 2/7/24 Melanoma Right superior shoulder Treatment Complete  2/9/24       Specialty Problems          Dermatology Problems    Other nail disorders        Objective   Well appearing patient in no apparent distress; mood and affect are within normal limits.    A full examination was performed including scalp, head, eyes, ears, nose, lips, neck, chest, axillae, abdomen, back, buttocks, bilateral upper extremities, bilateral lower extremities, hands, feet, fingers, toes, fingernails, and toenails. All findings within normal limits unless otherwise noted below.  Declined examination of genitals.    No palpable axillar adenopathy noted on examination today    Assessment/Plan   1. Photoaging of skin  Mottled pigmentation with telangiectasias and brown reticular macules in sun exposed areas of the body.     The risk of chronic, cumulative sun damage and risk of development of skin cancer was reviewed today.   The importance of sun protection was reviewed: including the use of a broad spectrum sunscreen that protects against both UVA/UVB rays, with ingredients such as Zinc oxide or titanium dioxide, wearing sun protective clothing and sun avoidance. We reviewed the warning signs of non-melanoma skin cancer and ABCDEs of melanoma  Please follow up should you notice any new or  changing pre-existing skin lesion.    Related Procedures  Follow Up In Dermatology - Established Patient    2. Personal history of malignant melanoma of skin  Right Shoulder - Anterior  Well healed scar at site of prior treatment without evidence of recurrence. No lymphadenopathy    There is no evidence of recurrence or repigmentation on clinical examination today, reassure was provided to the patient. The importance of sun protection was reviewed with the patient including the use of a broad spectrum sunscreen that protects against both UVA/UVB rays, with ingredients such as Zinc oxide or titanium dioxide, wearing sun protective clothing and sun avoidance. ABCDEs of melanoma reviewed. Patient to f/u should they notice any new or changing pre-existing skin lesion. The patient was advised to follow up 6 months for FBSE due to history of melanoma.    3. Personal history of other malignant neoplasm of skin  Right Upper Back  Well healed scar that is slightly hypertrophic at site of prior treatment without evidence of recurrence.    There is no evidence of recurrence on clinical examination today, reassurance was provided to the patient. The importance of sun protection was reviewed with the patient including the use of a broad spectrum sunscreen that protects against both UVA/UVB rays, with ingredients such as Zinc oxide or titanium dioxide, wearing sun protective clothing and sun avoidance. Warning signs of non-melanoma skin cancer were reviewed. ABCDEs of melanoma reviewed. Patient to f/u should they notice any new or changing pre-existing skin lesion    If symptomatic or becomes more hypertrophic can consider intralesional kenalog    4. Lentigo  Scattered tan macules in sun-exposed areas.    These are benign skin lesions due to sun exposure. They will darken in response to sun exposure. They should be monitored for change in size, shape or color.  These lesions can be treated cosmetically with topical creams, liquid  nitrogen and a variety of lasers.    5. Hemangioma of skin  Cherry red papules    The benign nature of these skin lesions were reviewed, no treatment is necessary.   Please follow up for any new or pre-existing lesion that is changing in size, shape, color, becomes painful, tender, itches or bleed.    6. Melanocytic nevi of trunk  Tan-brown symmetric macules and papules    Clinically benign appearing nevi, no treatment is necessary.  The importance of sun protection was reviewed: including the use of a broad spectrum sunscreen that protects against both UVA/UVB rays, with ingredients such as Zinc oxide or titanium dioxide, wearing sun protective clothing and sun avoidance.   ABCDEs of melanoma reviewed.  Please follow up should you notice any new or changing pre-existing skin lesion.    7. Seborrheic keratosis (2)  Generalized, Left Palmar Middle 2nd Finger  Brown, tan waxy macules and stuck on appearing papules and plaques    The benign nature of these skin lesions reviewed, reassure provided and no further treatment needed at this time.   These lesions can be removed, if symptomatic (itching, bleeding, rubbing on clothing, painful), otherwise removal is considered cosmetic.     8. Nummular dermatitis  Right Forearm - Posterior  A few small circular scaly pink patches    This is a common eczema (dermatitis) may occur anywhere on the body.  The goal of treatment is to restore the skin barrier and decrease inflammation and itching.  Treatments include topical corticosteroid therapy when the skin is red, itchy and flaky.     To prevent severity and frequency of recurrences a gentle skin care regimen should be followed which includes washing with a fragrance-free soap such as Dove for sensitive skin, Cetaphil or Cerave body washes. After rinsing, pat dry and apply a moisturizer such as Cerave, Cetaphil, or Vanicream. Creams are thicker than lotions and often provde better moisturization than lotions.      9. Encounter  for screening for malignant neoplasm of skin  No suspicious lesions noted on examination today.  No cervical or axillary lymphadenopathy bilaterally.    The risk of chronic, cumulative sun damage and risk of development of skin cancer was reviewed today.   The importance of sun protection was reviewed: including the use of a broad spectrum sunscreen that protects against both UVA/UVB rays, with ingredients such as Zinc oxide or titanium dioxide, wearing sun protective clothing and sun avoidance. We reviewed the warning signs of non-melanoma skin cancer and ABCDEs of melanoma  Please follow up should you notice any new or changing pre-existing skin lesion.    Related Procedures  Follow Up In Dermatology - Established Patient  Follow Up In Dermatology - Established Patient    Follow up in 6 months for a full body skin exam    Danie Hancock MD  PGY-2, Dermatology    I saw and evaluated the patient. I personally obtained the key and critical portions of the history and physical exam or was physically present for key and critical portions performed by the resident/fellow. I reviewed the resident/fellow's documentation and discussed the patient with the resident/fellow. I agree with the resident/fellow's medical decision making as documented in the note.    Kate Thompson, DO

## 2025-01-12 DIAGNOSIS — I10 ESSENTIAL HYPERTENSION: ICD-10-CM

## 2025-01-12 DIAGNOSIS — K21.9 GASTROESOPHAGEAL REFLUX DISEASE, UNSPECIFIED WHETHER ESOPHAGITIS PRESENT: ICD-10-CM

## 2025-01-14 ENCOUNTER — OFFICE VISIT (OUTPATIENT)
Dept: PRIMARY CARE | Facility: CLINIC | Age: 65
End: 2025-01-14
Payer: MEDICARE

## 2025-01-14 VITALS
WEIGHT: 157 LBS | HEIGHT: 62 IN | SYSTOLIC BLOOD PRESSURE: 110 MMHG | TEMPERATURE: 96.5 F | HEART RATE: 64 BPM | BODY MASS INDEX: 28.89 KG/M2 | OXYGEN SATURATION: 98 % | DIASTOLIC BLOOD PRESSURE: 78 MMHG

## 2025-01-14 DIAGNOSIS — R10.9 LEFT FLANK PAIN: ICD-10-CM

## 2025-01-14 DIAGNOSIS — R31.29 OTHER MICROSCOPIC HEMATURIA: ICD-10-CM

## 2025-01-14 DIAGNOSIS — I10 ESSENTIAL HYPERTENSION: ICD-10-CM

## 2025-01-14 DIAGNOSIS — R30.0 DYSURIA: Primary | ICD-10-CM

## 2025-01-14 LAB
POC APPEARANCE, URINE: CLEAR
POC BILIRUBIN, URINE: NEGATIVE
POC BLOOD, URINE: ABNORMAL
POC COLOR, URINE: YELLOW
POC GLUCOSE, URINE: NEGATIVE MG/DL
POC KETONES, URINE: NEGATIVE MG/DL
POC LEUKOCYTES, URINE: NEGATIVE
POC NITRITE,URINE: NEGATIVE
POC PH, URINE: 5.5 PH
POC PROTEIN, URINE: NEGATIVE MG/DL
POC SPECIFIC GRAVITY, URINE: >=1.03
POC UROBILINOGEN, URINE: 0.2 EU/DL

## 2025-01-14 PROCEDURE — 1123F ACP DISCUSS/DSCN MKR DOCD: CPT | Performed by: FAMILY MEDICINE

## 2025-01-14 PROCEDURE — 3078F DIAST BP <80 MM HG: CPT | Performed by: FAMILY MEDICINE

## 2025-01-14 PROCEDURE — 1158F ADVNC CARE PLAN TLK DOCD: CPT | Performed by: FAMILY MEDICINE

## 2025-01-14 PROCEDURE — 3074F SYST BP LT 130 MM HG: CPT | Performed by: FAMILY MEDICINE

## 2025-01-14 PROCEDURE — 99214 OFFICE O/P EST MOD 30 MIN: CPT | Performed by: FAMILY MEDICINE

## 2025-01-14 PROCEDURE — 3008F BODY MASS INDEX DOCD: CPT | Performed by: FAMILY MEDICINE

## 2025-01-14 PROCEDURE — 1160F RVW MEDS BY RX/DR IN RCRD: CPT | Performed by: FAMILY MEDICINE

## 2025-01-14 PROCEDURE — 1125F AMNT PAIN NOTED PAIN PRSNT: CPT | Performed by: FAMILY MEDICINE

## 2025-01-14 PROCEDURE — 87086 URINE CULTURE/COLONY COUNT: CPT | Performed by: FAMILY MEDICINE

## 2025-01-14 PROCEDURE — 1036F TOBACCO NON-USER: CPT | Performed by: FAMILY MEDICINE

## 2025-01-14 PROCEDURE — 1159F MED LIST DOCD IN RCRD: CPT | Performed by: FAMILY MEDICINE

## 2025-01-14 PROCEDURE — 81003 URINALYSIS AUTO W/O SCOPE: CPT | Mod: QW | Performed by: FAMILY MEDICINE

## 2025-01-14 RX ORDER — AMLODIPINE BESYLATE 5 MG/1
5 TABLET ORAL DAILY
Qty: 90 TABLET | Refills: 3 | Status: SHIPPED | OUTPATIENT
Start: 2025-01-14 | End: 2026-01-14

## 2025-01-14 RX ORDER — PANTOPRAZOLE SODIUM 40 MG/1
40 TABLET, DELAYED RELEASE ORAL
Qty: 90 TABLET | Refills: 3 | Status: SHIPPED | OUTPATIENT
Start: 2025-01-14 | End: 2026-01-14

## 2025-01-14 ASSESSMENT — PAIN SCALES - GENERAL: PAINLEVEL_OUTOF10: 3

## 2025-01-14 NOTE — ASSESSMENT & PLAN NOTE
- Urinalysis performed in office today which was generally unremarkable although did show signs of blood; will send for culture secondary to symptoms and to definitively rule out potential for bacterial infection  -Good hydration advocated

## 2025-01-14 NOTE — PROGRESS NOTES
Outpatient Visit Note    Chief Complaint   Patient presents with    Pain     Left flank pain, urgency, frequency, pain above bladder x1 week.          HPI:  Angie Hill is a 64 y.o. female who presents to the office secondary to concerns of possible UTI.  She was last seen in the office in July for annual well exam.    Past medical history significant for hypertension, GERD, anxiety and bilateral knee pain.      Reports urinary urgency, frequency with lower abdominal/left flank pain and dysuria.  Symptoms started approximately 1 week ago.  Did utilize OTC UTI test which was positive.    Of note, at prior encounter in October she noted persistent left flank pain for the last several months.  Had a history of sciatica to which she has been actively working with chiropractic medicine.  Was able to help with low back/sciatic issues though flank discomfort had continued.  Denied any urinary changes, hematuria, urgency or frequency.  Blood work was completed on 1/12/2024 including CBC, CMP, lipid panel TSH which was notable for hyperglycemia and hyperlipidemia.  Urinalysis was completed at that time which showed positive leukocytes and bacteria on microscopy.  Urine culture was not collected though patient did give recent sample with results showing no growth.    At prior encounter she stated to have continued elements of intermittent low back/flank pain.  Symptoms did flareup significantly approximately 2 weeks prior prompting her to get her test results done.  Additionally noted history of irritable bowel symptoms with diverticular disease and occasional diverticulitis flares.  Expressed concerns of possible diverticular flare but denies systemic complaint such as fever, chills or significant bowel changes.  Has traditionally used as needed Bentyl with prescription refill request at this time.  Was ultimately given refill on Bentyl and treated for possible diverticulitis    States that current flank pain  is different though she continues to have concerns regarding symptoms, particularly with her family history of ovarian cancer.  Is planning to have consultation with OB/GYN in the near future    Advanced directives: Living will/power of     Current Medications  Current Outpatient Medications   Medication Instructions    amLODIPine (NORVASC) 5 mg, oral, Daily    co-enzyme Q-10 30 mg, Daily    LORazepam (ATIVAN) 0.5 mg, oral, Every 6 hours PRN    losartan (COZAAR) 100 mg, oral, Daily    meloxicam (MOBIC) 15 mg, oral, Daily PRN    metFORMIN (GLUCOPHAGE) 500 mg, oral, Daily with evening meal    multivitamin tablet TAKE 1 TABLET DAILY.           OTC    pantoprazole (PROTONIX) 40 mg, oral, Daily before breakfast, Do not crush, chew, or split.        Allergies  Allergies   Allergen Reactions    Tetracyclines GI Upset    Penicillins Rash        Past Medical History:   Diagnosis Date    Anxiety     GERD (gastroesophageal reflux disease)     Hypertension     Personal history of other mental and behavioral disorders 2022    History of anxiety    Skin cancer (melanoma) (Multi) 2023      Past Surgical History:   Procedure Laterality Date    BREAST CYST ASPIRATION Right     Benign     SECTION, LOW TRANSVERSE  1992    LAPAROSCOPY DIAGNOSTIC / BIOPSY / ASPIRATION / LYSIS  2016    Exploratory Laparoscopy    OTHER SURGICAL HISTORY  10/12/2021    Knee surgery     Family History   Problem Relation Name Age of Onset    Heart disease Mother      Hypertension Mother      Lung disease Father      Ovarian cancer Sister  60     Social History     Tobacco Use    Smoking status: Never    Smokeless tobacco: Never   Vaping Use    Vaping status: Never Used   Substance Use Topics    Alcohol use: Not Currently    Drug use: Never       ROS  All pertinent positive symptoms are included in the history of present illness.  All other systems have been reviewed and are negative and noncontributory to this patient's  current ailments.    VITAL SIGNS  Vitals:    01/14/25 1453   BP: 110/78   Pulse: 64   Temp: 35.8 °C (96.5 °F)   SpO2: 98%       PHYSICAL EXAM  GENERAL APPEARANCE: alert and oriented, Pleasant and cooperative, No Acute Distress  HEENT: EOMI, PERRLA, MMM  ABDOMEN: soft, ND, no guarding or rigidity, no masses palpated  EXTREMITIES: no edema, normal ROM  SKIN: normal, no rash, unremarkable  NEUROLOGIC EXAM: non-focal exam  MUSCULOSKELETAL: no gross abnormalities  PSYCH: affect is normal, eye contact is good      Assessment/Plan   Problem List Items Addressed This Visit             ICD-10-CM    Essential hypertension I10     - Blood pressure stable on current regimen  -Continue to focus on healthy, balanced diet with moderation of salt/caffeine/alcohol and moderation of stress         Left flank pain R10.9     - Recent urinalysis showed presence of blood in urine which can be representative of irritation from possible infection or kidney stone; will monitor culture  - with recurrent symptoms, will plan for formal CT without contrast to rule out any abnormal pathology or potential kidney stones         Relevant Orders    Urine Culture    CT abdomen pelvis wo IV contrast    Dysuria - Primary R30.0     - Urinalysis performed in office today which was generally unremarkable although did show signs of blood; will send for culture secondary to symptoms and to definitively rule out potential for bacterial infection  -Good hydration advocated         Relevant Orders    POCT UA Automated manually resulted (Completed)    Urine Culture     Other Visit Diagnoses         Codes    Other microscopic hematuria     R31.29    Relevant Orders    Urine Culture    CT abdomen pelvis wo IV contrast            Counseling:       Medication education:         Education:  The patient is counseled regarding potential side-effects of all new medications        Understanding:  Patient expressed understanding        Adherence:  No barriers to  adherence identified    ** Please excuse any errors in grammar or translation related to this dictation. Voice recognition software was utilized to prepare this document. **

## 2025-01-14 NOTE — PATIENT INSTRUCTIONS
Problem List Items Addressed This Visit             ICD-10-CM    Essential hypertension I10     - Blood pressure stable on current regimen  -Continue to focus on healthy, balanced diet with moderation of salt/caffeine/alcohol and moderation of stress         Left flank pain R10.9     - Recent urinalysis showed presence of blood in urine which can be representative of irritation from possible infection or kidney stone; will monitor culture  - with recurrent symptoms, will plan for formal CT without contrast to rule out any abnormal pathology or potential kidney stones         Relevant Orders    Urine Culture    CT abdomen pelvis wo IV contrast    Dysuria - Primary R30.0     - Urinalysis performed in office today which was generally unremarkable although did show signs of blood; will send for culture secondary to symptoms and to definitively rule out potential for bacterial infection  -Good hydration advocated         Relevant Orders    POCT UA Automated manually resulted (Completed)    Urine Culture     Other Visit Diagnoses         Codes    Other microscopic hematuria     R31.29    Relevant Orders    Urine Culture    CT abdomen pelvis wo IV contrast            Counseling:       Medication education:         Education:  The patient is counseled regarding potential side-effects of all new medications        Understanding:  Patient expressed understanding        Adherence:  No barriers to adherence identified    ** Please excuse any errors in grammar or translation related to this dictation. Voice recognition software was utilized to prepare this document. **

## 2025-01-14 NOTE — ASSESSMENT & PLAN NOTE
- Recent urinalysis showed presence of blood in urine which can be representative of irritation from possible infection or kidney stone; will monitor culture  - with recurrent symptoms, will plan for formal CT without contrast to rule out any abnormal pathology or potential kidney stones

## 2025-01-15 ENCOUNTER — APPOINTMENT (OUTPATIENT)
Dept: OBSTETRICS AND GYNECOLOGY | Facility: CLINIC | Age: 65
End: 2025-01-15
Payer: MEDICARE

## 2025-01-15 VITALS — SYSTOLIC BLOOD PRESSURE: 130 MMHG | DIASTOLIC BLOOD PRESSURE: 62 MMHG | WEIGHT: 157 LBS | BODY MASS INDEX: 28.72 KG/M2

## 2025-01-15 DIAGNOSIS — Z80.41 FAMILY HISTORY OF OVARIAN CANCER: Primary | ICD-10-CM

## 2025-01-15 DIAGNOSIS — R10.2 PELVIC PAIN IN FEMALE: ICD-10-CM

## 2025-01-15 PROCEDURE — 99213 OFFICE O/P EST LOW 20 MIN: CPT | Performed by: STUDENT IN AN ORGANIZED HEALTH CARE EDUCATION/TRAINING PROGRAM

## 2025-01-15 PROCEDURE — 1160F RVW MEDS BY RX/DR IN RCRD: CPT | Performed by: STUDENT IN AN ORGANIZED HEALTH CARE EDUCATION/TRAINING PROGRAM

## 2025-01-15 PROCEDURE — 1159F MED LIST DOCD IN RCRD: CPT | Performed by: STUDENT IN AN ORGANIZED HEALTH CARE EDUCATION/TRAINING PROGRAM

## 2025-01-15 PROCEDURE — 3075F SYST BP GE 130 - 139MM HG: CPT | Performed by: STUDENT IN AN ORGANIZED HEALTH CARE EDUCATION/TRAINING PROGRAM

## 2025-01-15 PROCEDURE — 1036F TOBACCO NON-USER: CPT | Performed by: STUDENT IN AN ORGANIZED HEALTH CARE EDUCATION/TRAINING PROGRAM

## 2025-01-15 PROCEDURE — 3078F DIAST BP <80 MM HG: CPT | Performed by: STUDENT IN AN ORGANIZED HEALTH CARE EDUCATION/TRAINING PROGRAM

## 2025-01-15 NOTE — PROGRESS NOTES
Subjective   Patient ID: Angie Hill is a 64 y.o. female who presents for est patient gyn visit and consultation (Hysterectomy consult ).  Patient is here for surgical consultation. Her sister was recently diagnosed with ovarian cancer in her late 60s. Her sister is not doing well, she was diagnosed 7 years ago.    She is concerned about the reliability of screening for ovarian. Patient has been tested for BRCA and was found to be negative.    Patient has known endometriosis with pelvic adhesions.   She does have a hx of left dermal cysts as well.    She has daily pelvic pain. Unrelated to bleeding. She has cramping that is persistent and can affect her activities of daily living. The cramping can cause some flank pain as well. She does follow with her PCP. No urinary symptoms. No GI symptoms.   No fevers, chills. No HA/vision changes. No RUQ pain, n/v. No chest pain or SOB. No calf pain.        Review of Systems   All other systems reviewed and are negative.      Past Medical History:   Diagnosis Date   • Anxiety    • GERD (gastroesophageal reflux disease)    • Hypertension    • Personal history of other mental and behavioral disorders 2022    History of anxiety   • Skin cancer (melanoma) (Multi) 2023     Past Surgical History:   Procedure Laterality Date   • BREAST CYST ASPIRATION Right     Benign   •  SECTION, LOW TRANSVERSE  1992   • LAPAROSCOPY DIAGNOSTIC / BIOPSY / ASPIRATION / LYSIS  2016    Exploratory Laparoscopy   • OTHER SURGICAL HISTORY  10/12/2021    Knee surgery     Social History     Socioeconomic History   • Marital status:      Spouse name: Not on file   • Number of children: Not on file   • Years of education: Not on file   • Highest education level: Not on file   Occupational History   • Not on file   Tobacco Use   • Smoking status: Never   • Smokeless tobacco: Never   Vaping Use   • Vaping status: Never Used   Substance and Sexual Activity   • Alcohol  use: Not Currently   • Drug use: Never   • Sexual activity: Yes     Partners: Male   Other Topics Concern   • Not on file   Social History Narrative   • Not on file     Social Drivers of Health     Financial Resource Strain: Not on file   Food Insecurity: Not on file   Transportation Needs: Not on file   Physical Activity: Not on file   Stress: Not on file   Social Connections: Not on file   Intimate Partner Violence: Not At Risk (11/28/2023)    Humiliation, Afraid, Rape, and Kick questionnaire    • Fear of Current or Ex-Partner: No    • Emotionally Abused: No    • Physically Abused: No    • Sexually Abused: No   Housing Stability: Not on file       Objective   Physical Exam  General: A&Ox3  Head: Normocephalic, atraumatic  Heart/Lungs: RRR, No murmurs, gallops, or rubs. Lungs are CTAB.  Abdomen: Soft, nontender. BS+4. No bruising or masses.  Lower Extremities: No lower extremity Edema no palpable cords.     Assessment/Plan   Problem List Items Addressed This Visit       Family history of ovarian cancer - Primary    Overview     Genetic testing negative. However, the baseline risk to a patient with a first degree relative and negative genetic testing still has a meaningful increase of ovarian cancer by at least 3.1%. This risk increases significantly with age with an average age of 63-64 years of age. Given this increased risk, BSO is very reasonable to decrease her risk of endometrial cancer. Given her additional pain and normal ultrasound, TLH, BSO with a general OB is reasonable.    Women with a first degree relative of epithelial ovarian cancer as the patient do meet NIH criteria for risk reducing surgery.    Will schedule for TLH, BSO. No AUB, so EMB is not indicated. RBA discussed including but not limited to: Infection, bleeding, injury to adjacent structures such as bladder and bowel, need for admission or a larger incision.           Pelvic pain in female    Overview     Uncertain etiology. This has been a  longstanding issue for >10 years. It has been worsening and a nagging pain. No clear etiology. Hx of sister with ovarian cancer.     Studies have demonstrated that even in normal US and work up, hysterectomy can significantly benefit patients with pelvic pain. PFPT is an option, but given her family hx of ovarian cancer as well, surgical management as an initial treatment is reasonable in this case given likelihood in improvement of pain as well as prevention of ovarian cancer. We did discuss that there are no reliable screening tests for ovarian cancer.              Jessee Duque MD 01/15/25 9:28 PM

## 2025-01-16 LAB — BACTERIA UR CULT: NORMAL

## 2025-01-19 NOTE — PROGRESS NOTES
[facility] [place]: [facility]  PROGRESS NOTE      Angie Hill is a 64 y.o. female that is presenting today to establish care with new provider,  She is a former patient of Dr. LEONARDO Shepard.  She is scheduled for a hysterectomy on 2/5/25 w/ Dr. Duque.  She has a PHM of Hypertension, Mixed hyperlipidemia, Impaired Fasting Glucose, Mata's, GERD and IBS.  She reports she is having an abdominal ct scan on Friday for abdominal pain.  Has no other concerns today    Assessment/Plan   Assessment & Plan  Encounter to establish care  Medications and medical problem list reconciled today      CARE GAPS  Due for mammogram has order scheduled 1/23/2025  Last dexa scan 7/22/2024  Last colonoscopy 4 years ago  current with flu and covid vaccine  Essential hypertension  BP stable  Continue amlodipine 5mg daily  Contineu losartan 100mg daily    Mixed hyperlipidemia  Continue diet management  Due for lipid panel in July    IFG (impaired fasting glucose)  Last A1C 5.7 July 2024  Stopped metformin     Gastroesophageal reflux disease, unspecified whether esophagitis present  Continue pantoprazole 40mg daily    Mata's esophagus with dysplasia  Resolved per patient and last EGD      Left flank pain  Having ct scan this Friday 1/24/2025  Recently treated for uti    Anxiety  Takes ativan prn  OARRS reviewed last filled 7/2024 for 28 tablets  Takes sparingly  Orders:    LORazepam (Ativan) 0.5 mg tablet; Take 1 tablet (0.5 mg) by mouth every 6 hours if needed for anxiety for up to 7 days.      Subjective   HPI  Review of Systems   Constitutional: Negative.    Respiratory: Negative.     Cardiovascular: Negative.    Gastrointestinal:  Positive for abdominal pain.   Genitourinary: Negative.    Skin: Negative.    Neurological: Negative.    Psychiatric/Behavioral: Negative.        Objective   Vitals:    01/20/25 0757   BP: 129/81   Pulse: 66   Temp: 36.4 °C (97.6 °F)   SpO2: 94%      Body mass index is 28.72  "kg/m².  Physical Exam  Vitals reviewed.   Constitutional:       Appearance: Normal appearance.   Cardiovascular:      Rate and Rhythm: Normal rate and regular rhythm.      Pulses: Normal pulses.      Heart sounds: Normal heart sounds.   Pulmonary:      Effort: Pulmonary effort is normal.      Breath sounds: Normal breath sounds.   Abdominal:      General: Bowel sounds are normal.      Palpations: Abdomen is soft.   Skin:     General: Skin is warm and dry.   Neurological:      General: No focal deficit present.      Mental Status: She is alert and oriented to person, place, and time.   Psychiatric:         Mood and Affect: Mood normal.         Behavior: Behavior normal.         Thought Content: Thought content normal.         Judgment: Judgment normal.       Vitals:    01/20/25 0757   BP: 129/81   Pulse: 66   Temp: 36.4 °C (97.6 °F)   SpO2: 94%       Diagnostic Results   Lab Results   Component Value Date    GLUCOSE 117 (H) 07/16/2024    CALCIUM 9.9 07/16/2024     07/16/2024    K 4.8 07/16/2024    CO2 24 07/16/2024     07/16/2024    BUN 22 07/16/2024    CREATININE 1.00 07/16/2024     Lab Results   Component Value Date    ALT 18 07/16/2024    AST 18 07/16/2024    ALKPHOS 107 07/16/2024    BILITOT 0.7 07/16/2024     Lab Results   Component Value Date    WBC 6.0 01/11/2024    HGB 14.2 01/11/2024    HCT 43.8 01/11/2024    MCV 93 01/11/2024     01/11/2024     Lab Results   Component Value Date    CHOL 181 07/16/2024    CHOL 212 (H) 01/11/2024    CHOL 190 02/09/2023     Lab Results   Component Value Date    HDL 45.0 (L) 07/16/2024    HDL 50.0 (L) 01/11/2024    HDL 45.3 02/09/2023     Lab Results   Component Value Date    LDLCALC 109 07/16/2024    LDLCALC 129 01/11/2024     Lab Results   Component Value Date    TRIG 133 07/16/2024    TRIG 167 (H) 01/11/2024    TRIG 114 02/09/2023     No components found for: \"CHOLHDL\"  Lab Results   Component Value Date    HGBA1C 5.7 (H) 07/16/2024     Other labs not " included in the list above were reviewed either before or during this encounter.    History    Past Medical History:   Diagnosis Date    Anxiety     GERD (gastroesophageal reflux disease)     Hypertension     Personal history of other mental and behavioral disorders 2022    History of anxiety    Skin cancer (melanoma) (Multi) 2023     Past Surgical History:   Procedure Laterality Date    BREAST CYST ASPIRATION Right     Benign     SECTION, LOW TRANSVERSE  1992    LAPAROSCOPY DIAGNOSTIC / BIOPSY / ASPIRATION / LYSIS  2016    Exploratory Laparoscopy    OTHER SURGICAL HISTORY  10/12/2021    Knee surgery     Family History   Problem Relation Name Age of Onset    Heart disease Mother      Hypertension Mother      Lung disease Father      Ovarian cancer Sister  60     Social History     Socioeconomic History    Marital status:      Spouse name: Not on file    Number of children: Not on file    Years of education: Not on file    Highest education level: Not on file   Occupational History    Not on file   Tobacco Use    Smoking status: Never     Passive exposure: Past    Smokeless tobacco: Never   Vaping Use    Vaping status: Never Used   Substance and Sexual Activity    Alcohol use: Not Currently    Drug use: Never    Sexual activity: Yes     Partners: Male   Other Topics Concern    Not on file   Social History Narrative    Not on file     Social Drivers of Health     Financial Resource Strain: Not on file   Food Insecurity: Not on file   Transportation Needs: Not on file   Physical Activity: Not on file   Stress: Not on file   Social Connections: Not on file   Intimate Partner Violence: Not At Risk (2023)    Humiliation, Afraid, Rape, and Kick questionnaire     Fear of Current or Ex-Partner: No     Emotionally Abused: No     Physically Abused: No     Sexually Abused: No   Housing Stability: Not on file     Allergies   Allergen Reactions    Tetracyclines GI Upset    Penicillins  Rash     Current Outpatient Medications on File Prior to Visit   Medication Sig Dispense Refill    amLODIPine (Norvasc) 5 mg tablet Take 1 tablet (5 mg) by mouth once daily. 90 tablet 3    co-enzyme Q-10 30 mg capsule Take 1 capsule (30 mg) by mouth once daily.      meloxicam (Mobic) 15 mg tablet Take 1 tablet (15 mg) by mouth once daily as needed (for knee pain). 90 tablet 0    multivitamin tablet TAKE 1 TABLET DAILY.           OTC      pantoprazole (ProtoNix) 40 mg EC tablet Take 1 tablet (40 mg) by mouth once daily in the morning. Take before meals. Do not crush, chew, or split. 90 tablet 3    [DISCONTINUED] LORazepam (Ativan) 0.5 mg tablet Take 1 tablet (0.5 mg) by mouth every 6 hours if needed for anxiety for up to 7 days. 28 tablet 0    [DISCONTINUED] losartan (Cozaar) 100 mg tablet Take 1 tablet (100 mg) by mouth once daily. 90 tablet 1    [DISCONTINUED] amLODIPine (Norvasc) 5 mg tablet Take 1 tablet (5 mg) by mouth once daily. 90 tablet 1    [DISCONTINUED] metFORMIN (Glucophage) 500 mg tablet Take 1 tablet (500 mg) by mouth once daily in the evening. Take with meals. 90 tablet 1    [DISCONTINUED] pantoprazole (ProtoNix) 40 mg EC tablet Take 1 tablet (40 mg) by mouth once daily in the morning. Take before meals. Do not crush, chew, or split. 90 tablet 1     No current facility-administered medications on file prior to visit.     Immunization History   Administered Date(s) Administered    Flu vaccine (IIV4), preservative free *Check age/dose* 09/03/2020, 09/24/2021, 09/09/2022    Flu vaccine, quadrivalent, no egg protein, age 6 month or greater (FLUCELVAX) 09/09/2022, 09/13/2023    Influenza, Unspecified 09/28/2016    Influenza, seasonal, injectable 09/28/2016, 10/12/2023, 10/07/2024    Moderna SARS-CoV-2 Vaccination 02/02/2021, 03/04/2021, 11/23/2021    Novel influenza-H1N1-09, preservative-free 10/16/2009    Pfizer COVID-19 vaccine, 12 years and older, (30mcg/0.3mL) (Comirnaty) 10/12/2023    Pfizer COVID-19  vaccine, bivalent, age 12 years and older (30 mcg/0.3 mL) 09/09/2022    Pneumococcal conjugate vaccine, 20-valent (PREVNAR 20) 07/18/2024    SARS-CoV-2, Unspecified 10/07/2024    Tdap vaccine, age 7 year and older (BOOSTRIX, ADACEL) 01/30/2012    Zoster vaccine, recombinant, adult (SHINGRIX) 02/23/2024, 05/17/2024    Zoster, live 02/23/2024     Patient's medical history was reviewed and updated either before or during this encounter.       Kayleigh Daugherty, APRN-CNP

## 2025-01-20 ENCOUNTER — APPOINTMENT (OUTPATIENT)
Dept: PRIMARY CARE | Facility: CLINIC | Age: 65
End: 2025-01-20
Payer: MEDICARE

## 2025-01-20 ENCOUNTER — OFFICE VISIT (OUTPATIENT)
Dept: PRIMARY CARE | Facility: CLINIC | Age: 65
End: 2025-01-20
Payer: MEDICARE

## 2025-01-20 VITALS
HEART RATE: 66 BPM | WEIGHT: 157 LBS | OXYGEN SATURATION: 94 % | BODY MASS INDEX: 28.89 KG/M2 | HEIGHT: 62 IN | SYSTOLIC BLOOD PRESSURE: 129 MMHG | DIASTOLIC BLOOD PRESSURE: 81 MMHG | TEMPERATURE: 97.6 F

## 2025-01-20 DIAGNOSIS — K22.719 BARRETT'S ESOPHAGUS WITH DYSPLASIA: ICD-10-CM

## 2025-01-20 DIAGNOSIS — K21.9 GASTROESOPHAGEAL REFLUX DISEASE, UNSPECIFIED WHETHER ESOPHAGITIS PRESENT: ICD-10-CM

## 2025-01-20 DIAGNOSIS — I10 ESSENTIAL HYPERTENSION: ICD-10-CM

## 2025-01-20 DIAGNOSIS — R73.01 IFG (IMPAIRED FASTING GLUCOSE): ICD-10-CM

## 2025-01-20 DIAGNOSIS — E78.2 MIXED HYPERLIPIDEMIA: ICD-10-CM

## 2025-01-20 DIAGNOSIS — Z76.89 ENCOUNTER TO ESTABLISH CARE: Primary | ICD-10-CM

## 2025-01-20 DIAGNOSIS — F41.9 ANXIETY: ICD-10-CM

## 2025-01-20 DIAGNOSIS — R10.9 LEFT FLANK PAIN: ICD-10-CM

## 2025-01-20 PROBLEM — R07.9 CHEST PAIN: Status: RESOLVED | Noted: 2023-09-06 | Resolved: 2025-01-20

## 2025-01-20 PROBLEM — N76.0 BACTERIAL VAGINITIS: Status: RESOLVED | Noted: 2023-09-06 | Resolved: 2025-01-20

## 2025-01-20 PROBLEM — B96.89 BACTERIAL VAGINITIS: Status: RESOLVED | Noted: 2023-09-06 | Resolved: 2025-01-20

## 2025-01-20 PROBLEM — R30.0 DYSURIA: Status: RESOLVED | Noted: 2025-01-14 | Resolved: 2025-01-20

## 2025-01-20 PROCEDURE — 99214 OFFICE O/P EST MOD 30 MIN: CPT | Performed by: NURSE PRACTITIONER

## 2025-01-20 PROCEDURE — 1036F TOBACCO NON-USER: CPT | Performed by: NURSE PRACTITIONER

## 2025-01-20 PROCEDURE — 3079F DIAST BP 80-89 MM HG: CPT | Performed by: NURSE PRACTITIONER

## 2025-01-20 PROCEDURE — 3008F BODY MASS INDEX DOCD: CPT | Performed by: NURSE PRACTITIONER

## 2025-01-20 PROCEDURE — 1125F AMNT PAIN NOTED PAIN PRSNT: CPT | Performed by: NURSE PRACTITIONER

## 2025-01-20 PROCEDURE — 1159F MED LIST DOCD IN RCRD: CPT | Performed by: NURSE PRACTITIONER

## 2025-01-20 PROCEDURE — 3074F SYST BP LT 130 MM HG: CPT | Performed by: NURSE PRACTITIONER

## 2025-01-20 RX ORDER — LOSARTAN POTASSIUM 100 MG/1
100 TABLET ORAL DAILY
Qty: 90 TABLET | Refills: 1 | Status: SHIPPED | OUTPATIENT
Start: 2025-01-20 | End: 2025-07-19

## 2025-01-20 RX ORDER — LORAZEPAM 0.5 MG/1
0.5 TABLET ORAL EVERY 6 HOURS PRN
Qty: 28 TABLET | Refills: 0 | Status: SHIPPED | OUTPATIENT
Start: 2025-01-20 | End: 2025-01-27

## 2025-01-20 ASSESSMENT — ENCOUNTER SYMPTOMS
CONSTITUTIONAL NEGATIVE: 1
RESPIRATORY NEGATIVE: 1
CARDIOVASCULAR NEGATIVE: 1
ABDOMINAL PAIN: 1
NEUROLOGICAL NEGATIVE: 1
PSYCHIATRIC NEGATIVE: 1

## 2025-01-20 ASSESSMENT — PATIENT HEALTH QUESTIONNAIRE - PHQ9
1. LITTLE INTEREST OR PLEASURE IN DOING THINGS: NOT AT ALL
2. FEELING DOWN, DEPRESSED OR HOPELESS: NOT AT ALL
SUM OF ALL RESPONSES TO PHQ9 QUESTIONS 1 AND 2: 0

## 2025-01-20 ASSESSMENT — PAIN SCALES - GENERAL: PAINLEVEL_OUTOF10: 2

## 2025-01-20 NOTE — ASSESSMENT & PLAN NOTE
Takes ativan prn  OARRS reviewed last filled 7/2024 for 28 tablets  Takes sparingly  Orders:    LORazepam (Ativan) 0.5 mg tablet; Take 1 tablet (0.5 mg) by mouth every 6 hours if needed for anxiety for up to 7 days.

## 2025-01-23 ENCOUNTER — HOSPITAL ENCOUNTER (OUTPATIENT)
Dept: RADIOLOGY | Facility: CLINIC | Age: 65
Discharge: HOME | End: 2025-01-23
Payer: MEDICARE

## 2025-01-23 VITALS — BODY MASS INDEX: 28.89 KG/M2 | HEIGHT: 62 IN | WEIGHT: 156.97 LBS

## 2025-01-23 DIAGNOSIS — Z12.31 SCREENING MAMMOGRAM FOR BREAST CANCER: ICD-10-CM

## 2025-01-23 PROCEDURE — 77063 BREAST TOMOSYNTHESIS BI: CPT

## 2025-01-24 ENCOUNTER — HOSPITAL ENCOUNTER (OUTPATIENT)
Dept: RADIOLOGY | Facility: CLINIC | Age: 65
Discharge: HOME | End: 2025-01-24
Payer: MEDICARE

## 2025-01-24 DIAGNOSIS — R10.9 LEFT FLANK PAIN: ICD-10-CM

## 2025-01-24 DIAGNOSIS — R31.29 OTHER MICROSCOPIC HEMATURIA: ICD-10-CM

## 2025-01-24 PROCEDURE — 74176 CT ABD & PELVIS W/O CONTRAST: CPT

## 2025-01-27 ENCOUNTER — HOSPITAL ENCOUNTER (OUTPATIENT)
Dept: CARDIOLOGY | Facility: HOSPITAL | Age: 65
Discharge: HOME | End: 2025-01-27
Payer: MEDICARE

## 2025-01-27 ENCOUNTER — PRE-ADMISSION TESTING (OUTPATIENT)
Dept: PREADMISSION TESTING | Facility: HOSPITAL | Age: 65
End: 2025-01-27
Payer: MEDICARE

## 2025-01-27 DIAGNOSIS — I10 ESSENTIAL HYPERTENSION: ICD-10-CM

## 2025-01-27 DIAGNOSIS — K21.9 GASTROESOPHAGEAL REFLUX DISEASE, UNSPECIFIED WHETHER ESOPHAGITIS PRESENT: ICD-10-CM

## 2025-01-27 DIAGNOSIS — Z80.41 FAMILY HISTORY OF OVARIAN CANCER: ICD-10-CM

## 2025-01-27 DIAGNOSIS — Z01.818 PRE-OP EVALUATION: Primary | ICD-10-CM

## 2025-01-27 LAB
ATRIAL RATE: 64 BPM
P AXIS: 37 DEGREES
PR INTERVAL: 158 MS
Q ONSET: 252 MS
QRS COUNT: 10 BEATS
QRS DURATION: 100 MS
QT INTERVAL: 414 MS
QTC CALCULATION(BAZETT): 431 MS
QTC FREDERICIA: 425 MS
R AXIS: -7 DEGREES
T AXIS: -10 DEGREES
T OFFSET: 459 MS
VENTRICULAR RATE: 65 BPM

## 2025-01-27 PROCEDURE — 93010 ELECTROCARDIOGRAM REPORT: CPT | Performed by: INTERNAL MEDICINE

## 2025-01-27 PROCEDURE — 93005 ELECTROCARDIOGRAM TRACING: CPT

## 2025-01-30 ENCOUNTER — ANESTHESIA EVENT (OUTPATIENT)
Dept: OPERATING ROOM | Facility: HOSPITAL | Age: 65
End: 2025-01-30
Payer: MEDICARE

## 2025-02-03 RX ORDER — CEFAZOLIN SODIUM 2 G/100ML
2 INJECTION, SOLUTION INTRAVENOUS ONCE
Status: CANCELLED | OUTPATIENT
Start: 2025-02-03 | End: 2025-02-03

## 2025-02-04 ENCOUNTER — ANESTHESIA (OUTPATIENT)
Dept: OPERATING ROOM | Facility: HOSPITAL | Age: 65
End: 2025-02-04
Payer: MEDICARE

## 2025-02-04 ENCOUNTER — HOSPITAL ENCOUNTER (OUTPATIENT)
Facility: HOSPITAL | Age: 65
Setting detail: OUTPATIENT SURGERY
Discharge: HOME | End: 2025-02-04
Attending: STUDENT IN AN ORGANIZED HEALTH CARE EDUCATION/TRAINING PROGRAM | Admitting: STUDENT IN AN ORGANIZED HEALTH CARE EDUCATION/TRAINING PROGRAM
Payer: MEDICARE

## 2025-02-04 ENCOUNTER — PHARMACY VISIT (OUTPATIENT)
Dept: PHARMACY | Facility: CLINIC | Age: 65
End: 2025-02-04
Payer: COMMERCIAL

## 2025-02-04 VITALS
OXYGEN SATURATION: 100 % | WEIGHT: 157 LBS | TEMPERATURE: 96 F | SYSTOLIC BLOOD PRESSURE: 174 MMHG | RESPIRATION RATE: 13 BRPM | HEIGHT: 62 IN | DIASTOLIC BLOOD PRESSURE: 95 MMHG | HEART RATE: 66 BPM | BODY MASS INDEX: 28.89 KG/M2

## 2025-02-04 DIAGNOSIS — G89.18 POST-OP PAIN: Primary | ICD-10-CM

## 2025-02-04 DIAGNOSIS — Z80.41 FAMILY HISTORY OF OVARIAN CANCER: ICD-10-CM

## 2025-02-04 DIAGNOSIS — R10.2 PELVIC PAIN IN FEMALE: ICD-10-CM

## 2025-02-04 LAB
ANION GAP SERPL CALC-SCNC: 11 MMOL/L (ref 10–20)
BUN SERPL-MCNC: 23 MG/DL (ref 6–23)
CALCIUM SERPL-MCNC: 9.4 MG/DL (ref 8.6–10.3)
CHLORIDE SERPL-SCNC: 108 MMOL/L (ref 98–107)
CO2 SERPL-SCNC: 24 MMOL/L (ref 21–32)
CREAT SERPL-MCNC: 0.83 MG/DL (ref 0.5–1.05)
EGFRCR SERPLBLD CKD-EPI 2021: 78 ML/MIN/1.73M*2
ERYTHROCYTE [DISTWIDTH] IN BLOOD BY AUTOMATED COUNT: 13.5 % (ref 11.5–14.5)
GLUCOSE SERPL-MCNC: 113 MG/DL (ref 74–99)
HCT VFR BLD AUTO: 38.4 % (ref 36–46)
HGB BLD-MCNC: 13 G/DL (ref 12–16)
MCH RBC QN AUTO: 30.3 PG (ref 26–34)
MCHC RBC AUTO-ENTMCNC: 33.9 G/DL (ref 32–36)
MCV RBC AUTO: 90 FL (ref 80–100)
NRBC BLD-RTO: 0 /100 WBCS (ref 0–0)
PLATELET # BLD AUTO: 163 X10*3/UL (ref 150–450)
POTASSIUM SERPL-SCNC: 4 MMOL/L (ref 3.5–5.3)
RBC # BLD AUTO: 4.29 X10*6/UL (ref 4–5.2)
SODIUM SERPL-SCNC: 139 MMOL/L (ref 136–145)
WBC # BLD AUTO: 6.4 X10*3/UL (ref 4.4–11.3)

## 2025-02-04 PROCEDURE — 3700000002 HC GENERAL ANESTHESIA TIME - EACH INCREMENTAL 1 MINUTE: Performed by: STUDENT IN AN ORGANIZED HEALTH CARE EDUCATION/TRAINING PROGRAM

## 2025-02-04 PROCEDURE — 7100000010 HC PHASE TWO TIME - EACH INCREMENTAL 1 MINUTE: Performed by: STUDENT IN AN ORGANIZED HEALTH CARE EDUCATION/TRAINING PROGRAM

## 2025-02-04 PROCEDURE — 3600000009 HC OR TIME - EACH INCREMENTAL 1 MINUTE - PROCEDURE LEVEL FOUR: Performed by: STUDENT IN AN ORGANIZED HEALTH CARE EDUCATION/TRAINING PROGRAM

## 2025-02-04 PROCEDURE — 36415 COLL VENOUS BLD VENIPUNCTURE: CPT | Performed by: ANESTHESIOLOGY

## 2025-02-04 PROCEDURE — 2500000004 HC RX 250 GENERAL PHARMACY W/ HCPCS (ALT 636 FOR OP/ED): Mod: JZ | Performed by: ANESTHESIOLOGY

## 2025-02-04 PROCEDURE — 85027 COMPLETE CBC AUTOMATED: CPT | Performed by: ANESTHESIOLOGY

## 2025-02-04 PROCEDURE — 7100000002 HC RECOVERY ROOM TIME - EACH INCREMENTAL 1 MINUTE: Performed by: STUDENT IN AN ORGANIZED HEALTH CARE EDUCATION/TRAINING PROGRAM

## 2025-02-04 PROCEDURE — 7100000001 HC RECOVERY ROOM TIME - INITIAL BASE CHARGE: Performed by: STUDENT IN AN ORGANIZED HEALTH CARE EDUCATION/TRAINING PROGRAM

## 2025-02-04 PROCEDURE — 2500000004 HC RX 250 GENERAL PHARMACY W/ HCPCS (ALT 636 FOR OP/ED): Performed by: STUDENT IN AN ORGANIZED HEALTH CARE EDUCATION/TRAINING PROGRAM

## 2025-02-04 PROCEDURE — 80048 BASIC METABOLIC PNL TOTAL CA: CPT | Performed by: ANESTHESIOLOGY

## 2025-02-04 PROCEDURE — 7100000009 HC PHASE TWO TIME - INITIAL BASE CHARGE: Performed by: STUDENT IN AN ORGANIZED HEALTH CARE EDUCATION/TRAINING PROGRAM

## 2025-02-04 PROCEDURE — 88307 TISSUE EXAM BY PATHOLOGIST: CPT | Mod: TC,PORLAB | Performed by: STUDENT IN AN ORGANIZED HEALTH CARE EDUCATION/TRAINING PROGRAM

## 2025-02-04 PROCEDURE — 2500000004 HC RX 250 GENERAL PHARMACY W/ HCPCS (ALT 636 FOR OP/ED): Performed by: NURSE ANESTHETIST, CERTIFIED REGISTERED

## 2025-02-04 PROCEDURE — RXMED WILLOW AMBULATORY MEDICATION CHARGE

## 2025-02-04 PROCEDURE — 3700000001 HC GENERAL ANESTHESIA TIME - INITIAL BASE CHARGE: Performed by: STUDENT IN AN ORGANIZED HEALTH CARE EDUCATION/TRAINING PROGRAM

## 2025-02-04 PROCEDURE — 58571 TLH W/T/O 250 G OR LESS: CPT | Performed by: STUDENT IN AN ORGANIZED HEALTH CARE EDUCATION/TRAINING PROGRAM

## 2025-02-04 PROCEDURE — 2500000001 HC RX 250 WO HCPCS SELF ADMINISTERED DRUGS (ALT 637 FOR MEDICARE OP): Performed by: ANESTHESIOLOGY

## 2025-02-04 PROCEDURE — 2720000007 HC OR 272 NO HCPCS: Performed by: STUDENT IN AN ORGANIZED HEALTH CARE EDUCATION/TRAINING PROGRAM

## 2025-02-04 PROCEDURE — 3600000004 HC OR TIME - INITIAL BASE CHARGE - PROCEDURE LEVEL FOUR: Performed by: STUDENT IN AN ORGANIZED HEALTH CARE EDUCATION/TRAINING PROGRAM

## 2025-02-04 PROCEDURE — 58571 TLH W/T/O 250 G OR LESS: CPT | Performed by: OBSTETRICS & GYNECOLOGY

## 2025-02-04 RX ORDER — FENTANYL CITRATE 50 UG/ML
INJECTION, SOLUTION INTRAMUSCULAR; INTRAVENOUS AS NEEDED
Status: DISCONTINUED | OUTPATIENT
Start: 2025-02-04 | End: 2025-02-04

## 2025-02-04 RX ORDER — FAMOTIDINE 10 MG/ML
20 INJECTION INTRAVENOUS ONCE
Status: COMPLETED | OUTPATIENT
Start: 2025-02-04 | End: 2025-02-04

## 2025-02-04 RX ORDER — ONDANSETRON HYDROCHLORIDE 2 MG/ML
4 INJECTION, SOLUTION INTRAVENOUS ONCE AS NEEDED
Status: DISCONTINUED | OUTPATIENT
Start: 2025-02-04 | End: 2025-02-04 | Stop reason: HOSPADM

## 2025-02-04 RX ORDER — CEFAZOLIN SODIUM 2 G/100ML
2 INJECTION, SOLUTION INTRAVENOUS ONCE
Status: DISCONTINUED | OUTPATIENT
Start: 2025-02-04 | End: 2025-02-04 | Stop reason: HOSPADM

## 2025-02-04 RX ORDER — ONDANSETRON HYDROCHLORIDE 2 MG/ML
4 INJECTION, SOLUTION INTRAVENOUS ONCE
Status: COMPLETED | OUTPATIENT
Start: 2025-02-04 | End: 2025-02-04

## 2025-02-04 RX ORDER — ALBUTEROL SULFATE 0.83 MG/ML
2.5 SOLUTION RESPIRATORY (INHALATION) ONCE
Status: DISCONTINUED | OUTPATIENT
Start: 2025-02-04 | End: 2025-02-04 | Stop reason: HOSPADM

## 2025-02-04 RX ORDER — ONDANSETRON HYDROCHLORIDE 2 MG/ML
INJECTION, SOLUTION INTRAVENOUS AS NEEDED
Status: DISCONTINUED | OUTPATIENT
Start: 2025-02-04 | End: 2025-02-04

## 2025-02-04 RX ORDER — AMOXICILLIN 250 MG
1 CAPSULE ORAL NIGHTLY PRN
Qty: 14 TABLET | Refills: 1 | Status: SHIPPED | OUTPATIENT
Start: 2025-02-04

## 2025-02-04 RX ORDER — MIDAZOLAM HYDROCHLORIDE 1 MG/ML
INJECTION, SOLUTION INTRAMUSCULAR; INTRAVENOUS AS NEEDED
Status: DISCONTINUED | OUTPATIENT
Start: 2025-02-04 | End: 2025-02-04

## 2025-02-04 RX ORDER — LIDOCAINE HYDROCHLORIDE AND EPINEPHRINE 10; 10 UG/ML; MG/ML
INJECTION, SOLUTION INFILTRATION; PERINEURAL AS NEEDED
Status: DISCONTINUED | OUTPATIENT
Start: 2025-02-04 | End: 2025-02-04 | Stop reason: HOSPADM

## 2025-02-04 RX ORDER — SIMETHICONE 80 MG
80 TABLET,CHEWABLE ORAL EVERY 6 HOURS PRN
Qty: 30 TABLET | Refills: 1 | Status: SHIPPED | OUTPATIENT
Start: 2025-02-04

## 2025-02-04 RX ORDER — PROPOFOL 10 MG/ML
INJECTION, EMULSION INTRAVENOUS AS NEEDED
Status: DISCONTINUED | OUTPATIENT
Start: 2025-02-04 | End: 2025-02-04

## 2025-02-04 RX ORDER — SODIUM CHLORIDE 9 MG/ML
20 INJECTION, SOLUTION INTRAVENOUS CONTINUOUS
Status: DISCONTINUED | OUTPATIENT
Start: 2025-02-04 | End: 2025-02-04 | Stop reason: HOSPADM

## 2025-02-04 RX ORDER — METOCLOPRAMIDE HYDROCHLORIDE 5 MG/ML
10 INJECTION INTRAMUSCULAR; INTRAVENOUS ONCE
Status: COMPLETED | OUTPATIENT
Start: 2025-02-04 | End: 2025-02-04

## 2025-02-04 RX ORDER — CEFAZOLIN 1 G/1
INJECTION, POWDER, FOR SOLUTION INTRAVENOUS AS NEEDED
Status: DISCONTINUED | OUTPATIENT
Start: 2025-02-04 | End: 2025-02-04

## 2025-02-04 RX ORDER — ROCURONIUM BROMIDE 10 MG/ML
INJECTION, SOLUTION INTRAVENOUS AS NEEDED
Status: DISCONTINUED | OUTPATIENT
Start: 2025-02-04 | End: 2025-02-04

## 2025-02-04 RX ORDER — LIDOCAINE HYDROCHLORIDE 20 MG/ML
INJECTION, SOLUTION INFILTRATION; PERINEURAL AS NEEDED
Status: DISCONTINUED | OUTPATIENT
Start: 2025-02-04 | End: 2025-02-04

## 2025-02-04 RX ORDER — SODIUM CITRATE AND CITRIC ACID MONOHYDRATE 334; 500 MG/5ML; MG/5ML
30 SOLUTION ORAL ONCE
Status: COMPLETED | OUTPATIENT
Start: 2025-02-04 | End: 2025-02-04

## 2025-02-04 RX ORDER — IBUPROFEN 600 MG/1
600 TABLET ORAL EVERY 6 HOURS PRN
Qty: 30 TABLET | Refills: 1 | Status: SHIPPED | OUTPATIENT
Start: 2025-02-04

## 2025-02-04 RX ORDER — KETOROLAC TROMETHAMINE 30 MG/ML
INJECTION, SOLUTION INTRAMUSCULAR; INTRAVENOUS AS NEEDED
Status: DISCONTINUED | OUTPATIENT
Start: 2025-02-04 | End: 2025-02-04

## 2025-02-04 RX ORDER — OXYCODONE HYDROCHLORIDE 5 MG/1
5 TABLET ORAL EVERY 6 HOURS PRN
Qty: 28 TABLET | Refills: 0 | Status: SHIPPED | OUTPATIENT
Start: 2025-02-04 | End: 2025-02-11

## 2025-02-04 RX ORDER — ONDANSETRON 4 MG/1
4 TABLET, ORALLY DISINTEGRATING ORAL EVERY 8 HOURS PRN
Qty: 40 TABLET | Refills: 1 | Status: SHIPPED | OUTPATIENT
Start: 2025-02-04

## 2025-02-04 RX ORDER — HYDROMORPHONE HYDROCHLORIDE 0.2 MG/ML
0.2 INJECTION INTRAMUSCULAR; INTRAVENOUS; SUBCUTANEOUS EVERY 5 MIN PRN
Status: DISCONTINUED | OUTPATIENT
Start: 2025-02-04 | End: 2025-02-04 | Stop reason: HOSPADM

## 2025-02-04 RX ORDER — ACETAMINOPHEN 325 MG/1
975 TABLET ORAL ONCE
Status: COMPLETED | OUTPATIENT
Start: 2025-02-04 | End: 2025-02-04

## 2025-02-04 RX ADMIN — HYDROMORPHONE HYDROCHLORIDE 0.2 MG: 0.2 INJECTION, SOLUTION INTRAMUSCULAR; INTRAVENOUS; SUBCUTANEOUS at 10:24

## 2025-02-04 RX ADMIN — PROPOFOL 150 MG: 10 INJECTION, EMULSION INTRAVENOUS at 07:50

## 2025-02-04 RX ADMIN — DEXAMETHASONE SODIUM PHOSPHATE 8 MG: 4 INJECTION, SOLUTION INTRAMUSCULAR; INTRAVENOUS at 06:54

## 2025-02-04 RX ADMIN — MIDAZOLAM 2 MG: 1 INJECTION INTRAMUSCULAR; INTRAVENOUS at 07:47

## 2025-02-04 RX ADMIN — KETOROLAC TROMETHAMINE 30 MG: 30 INJECTION, SOLUTION INTRAMUSCULAR; INTRAVENOUS at 09:08

## 2025-02-04 RX ADMIN — ROCURONIUM BROMIDE 40 MG: 10 INJECTION, SOLUTION INTRAVENOUS at 07:50

## 2025-02-04 RX ADMIN — FENTANYL CITRATE 50 MCG: 50 INJECTION INTRAMUSCULAR; INTRAVENOUS at 08:01

## 2025-02-04 RX ADMIN — ROCURONIUM BROMIDE 10 MG: 10 INJECTION, SOLUTION INTRAVENOUS at 08:44

## 2025-02-04 RX ADMIN — ONDANSETRON 4 MG: 2 INJECTION INTRAMUSCULAR; INTRAVENOUS at 09:00

## 2025-02-04 RX ADMIN — ONDANSETRON 4 MG: 2 INJECTION INTRAMUSCULAR; INTRAVENOUS at 06:54

## 2025-02-04 RX ADMIN — ACETAMINOPHEN 975 MG: 325 TABLET ORAL at 06:54

## 2025-02-04 RX ADMIN — HYDROMORPHONE HYDROCHLORIDE 0.5 MG: 0.5 INJECTION, SOLUTION INTRAMUSCULAR; INTRAVENOUS; SUBCUTANEOUS at 09:42

## 2025-02-04 RX ADMIN — DEXAMETHASONE SODIUM PHOSPHATE 4 MG: 4 INJECTION INTRA-ARTICULAR; INTRALESIONAL; INTRAMUSCULAR; INTRAVENOUS; SOFT TISSUE at 09:01

## 2025-02-04 RX ADMIN — CEFAZOLIN 2 G: 1 INJECTION, POWDER, FOR SOLUTION INTRAMUSCULAR; INTRAVENOUS at 07:52

## 2025-02-04 RX ADMIN — METOCLOPRAMIDE 10 MG: 5 INJECTION, SOLUTION INTRAMUSCULAR; INTRAVENOUS at 06:54

## 2025-02-04 RX ADMIN — SODIUM CITRATE AND CITRIC ACID MONOHYDRATE 30 ML: 500; 334 SOLUTION ORAL at 06:54

## 2025-02-04 RX ADMIN — FAMOTIDINE 20 MG: 10 INJECTION, SOLUTION INTRAVENOUS at 06:54

## 2025-02-04 RX ADMIN — SUGAMMADEX 200 MG: 100 INJECTION, SOLUTION INTRAVENOUS at 09:11

## 2025-02-04 RX ADMIN — LIDOCAINE HYDROCHLORIDE 50 MG: 20 INJECTION, SOLUTION INFILTRATION; PERINEURAL at 07:50

## 2025-02-04 RX ADMIN — FENTANYL CITRATE 50 MCG: 50 INJECTION INTRAMUSCULAR; INTRAVENOUS at 08:05

## 2025-02-04 SDOH — HEALTH STABILITY: MENTAL HEALTH: CURRENT SMOKER: 0

## 2025-02-04 ASSESSMENT — PAIN SCALES - GENERAL
PAINLEVEL_OUTOF10: 7
PAIN_LEVEL: 4
PAINLEVEL_OUTOF10: 4
PAINLEVEL_OUTOF10: 4
PAINLEVEL_OUTOF10: 3
PAINLEVEL_OUTOF10: 4
PAINLEVEL_OUTOF10: 3

## 2025-02-04 ASSESSMENT — PAIN - FUNCTIONAL ASSESSMENT
PAIN_FUNCTIONAL_ASSESSMENT: 0-10

## 2025-02-04 ASSESSMENT — COLUMBIA-SUICIDE SEVERITY RATING SCALE - C-SSRS
2. HAVE YOU ACTUALLY HAD ANY THOUGHTS OF KILLING YOURSELF?: NO
1. IN THE PAST MONTH, HAVE YOU WISHED YOU WERE DEAD OR WISHED YOU COULD GO TO SLEEP AND NOT WAKE UP?: NO
6. HAVE YOU EVER DONE ANYTHING, STARTED TO DO ANYTHING, OR PREPARED TO DO ANYTHING TO END YOUR LIFE?: NO

## 2025-02-04 NOTE — ANESTHESIA POSTPROCEDURE EVALUATION
Patient: Angie Hill    Procedure Summary       Date: 02/04/25 Room / Location: [facility] OR 02 / Virtual [facility] OR    Anesthesia Start: 0744 Anesthesia Stop: 0930    Procedure: TOTAL HYSTERECTOMY, LAPAROSCOPIC, WITH BILATERAL SALPINGO-OOPHORECTOMY, CYSTO (DR HARRISON TO ASSIST) (Bilateral: Pelvis) Diagnosis:       Family history of ovarian cancer      Pelvic pain in female      (Family history of ovarian cancer [Z80.41])      (Pelvic pain in female [R10.2])    Surgeons: Jessee Duque MD Responsible Provider: PAXTON Gerardo    Anesthesia Type: general ASA Status: 2            Anesthesia Type: general    Vitals Value Taken Time   /95 02/04/25 0930   Temp 35.6 °C (96 °F) 02/04/25 0930   Pulse 65 02/04/25 0930   Resp 22 02/04/25 0930   SpO2 95 % 02/04/25 0930   Vitals shown include unfiled device data.    Anesthesia Post Evaluation    Patient location during evaluation: PACU  Patient participation: complete - patient participated  Level of consciousness: awake and alert  Pain score: 4  Pain management: adequate  Airway patency: patent  Cardiovascular status: acceptable and hemodynamically stable  Respiratory status: acceptable and room air  Hydration status: acceptable  Postoperative Nausea and Vomiting: none      No notable events documented.    
21:37

## 2025-02-04 NOTE — ANESTHESIA PREPROCEDURE EVALUATION
Patient: Angie Hill    Procedure Information       Date/Time: 02/04/25 0705    Procedure: TOTAL HYSTERECTOMY, LAPAROSCOPIC, WITH BILATERAL SALPINGO-OOPHORECTOMY (DR HARRISON TO ASSIST) (Bilateral: Pelvis) - TOTAL HYSTERECTOMY, LAPAROSCOPIC, WITH BILATERAL SALPINGO-OOPHORECTOMY    Location: [facility] OR 02 / Virtual [facility] OR    Surgeons: Jessee Duque MD            Relevant Problems   Cardiac   (+) Essential hypertension   (+) Mixed hyperlipidemia      Neuro   (+) Anxiety      GI   (+) Dysphagia   (+) Gastroesophageal reflux disease   (+) Irritable bowel syndrome      HEENT   (+) Acute non-recurrent pansinusitis       Clinical information reviewed:   Tobacco  Allergies  Meds  Problems  Med Hx  Surg Hx   Fam Hx  Soc   Hx        NPO Detail:  NPO/Void Status  Date of Last Liquid: 02/04/25  Time of Last Liquid: 0500  Date of Last Solid: 02/03/25  Time of Last Solid: 1900  Time of Last Void: 0622         Physical Exam    Airway  Mallampati: II  TM distance: >3 FB     Cardiovascular - normal exam     Dental - normal exam     Pulmonary - normal exam     Abdominal - normal exam             Anesthesia Plan    History of general anesthesia?: yes  History of complications of general anesthesia?: no    ASA 2     general     The patient is not a current smoker.  Patient was previously instructed to abstain from smoking on day of procedure.  Patient did not smoke on day of procedure.  Education provided regarding risk of obstructive sleep apnea.  Anesthetic plan and risks discussed with patient.    Plan discussed with CRNA.

## 2025-02-04 NOTE — ANESTHESIA PROCEDURE NOTES
Airway  Date/Time: 2/4/2025 7:50 AM  Urgency: elective    Airway not difficult    Staffing  Performed: CRNA   Authorized by: PAXTON Gerardo    Performed by: PAXTON Gerardo  Patient location during procedure: OR    Indications and Patient Condition  Indications for airway management: anesthesia  Spontaneous ventilation: present  Sedation level: deep  Preoxygenated: yes  Patient position: sniffing  MILS maintained throughout  Mask difficulty assessment: 1 - vent by mask    Final Airway Details  Final airway type: endotracheal airway      Successful airway: ETT  Cuffed: yes   Successful intubation technique: video laryngoscopy  Blade size: #4  ETT size (mm): 7.0  Cormack-Lehane Classification: grade I - full view of glottis  Placement verified by: chest auscultation and capnometry   Cuff volume (mL): 8  Number of attempts at approach: 1  Number of other approaches attempted: 0

## 2025-02-04 NOTE — DISCHARGE INSTRUCTIONS
No [place] for 6 weeks.  No lifting >20 lbs for 4 weeks. Slow return of physical activity for 6 weeks.  No tub baths for 4 weeks.    You May Shower.  Return to Regular diet at home.    Call your provider if you experience:  Notify provider for bad-smelling vaginal blood or discharge  Notify provider for blurry vision or spots before your eyes  Notify provider for fever or chills  Notify provider for heavy bleeding  Soaking a large pad every hour or passing large clots.  Notify provider for incision that is not healing, is red or more painful, or has pus or drainage  Notify provider for pain, burning or difficulty with emptying your bladder  Notify provider for red or swollen leg that is painful or warm to touch

## 2025-02-04 NOTE — OP NOTE
TOTAL HYSTERECTOMY, LAPAROSCOPIC, WITH BILATERAL SALPINGO-OOPHORECTOMY, CYSTO (DR HARRISON TO ASSIST) (B) Operative Note     Date: 2025  OR Location: [facility] OR    Name: Angie Hill, : 1960, Age: 64 y.o., MRN: [id number], Sex: female    Diagnosis  Pre-op Diagnosis      * Family history of ovarian cancer [Z80.41]     * Pelvic pain in female [R10.2] Post-op Diagnosis     * Family history of ovarian cancer [Z80.41]     * Pelvic pain in female [R10.2]     Procedures  TOTAL HYSTERECTOMY, LAPAROSCOPIC, WITH BILATERAL SALPINGO-OOPHORECTOMY, CYSTO (DR HARRISON TO ASSIST)  89634 - [place] LAPS TOTAL HYSTERECT 250 GM/< W/RMVL TUBE/OVARY      Surgeons      * Jessee Duque - Primary    Resident/Fellow/Other Assistant:  Surgeons and Role:     * Radha Harrison MD - Assisting    Staff:   Circulator: Mireya Daileyub Person: Harmony Naylor Person: Hanna    Anesthesia Staff: CRNA: KEYUR Gerardo-CRNA    Procedure Summary  Anesthesia: General  ASA: II  Estimated Blood Loss: 20 mL  Intra-op Medications:   Administrations occurring from 0705 to 0915 on 25:   Medication Name Total Dose   ceFAZolin (Ancef) vial 1 g 2 g   dexAMETHasone (Decadron) injection 4 mg/mL 4 mg   fentaNYL (Sublimaze) injection 50 mcg/mL 100 mcg   ketorolac (Toradol) 30 mg 30 mg   lidocaine (Xylocaine) injection 2 % 50 mg   midazolam (Versed) injection 1 mg/mL 2 mg   ondansetron (Zofran) 2 mg/mL injection 4 mg   propofol (Diprivan) injection 10 mg/mL 150 mg   rocuronium (ZeMuron) 50 mg/5 mL injection 50 mg              Anesthesia Record               Intraprocedure I/O Totals       None           Specimen:   ID Type Source Tests Collected by Time   1 : UTERUS, CERVIX, BILATERAL FALLOPIAN TUBES, BILATERAL OVARIES Tissue UTERUS, CERVIX, FALLOPIAN TUBES AND OVARIES BILATERAL SURGICAL PATHOLOGY EXAM Jessee Duque MD 2025 [id number]                 Drains and/or Catheters:   Urethral Catheter Non-latex 16 Fr. (Active)       Tourniquet Times:          Implants:     Findings: Normal appearing cervix, uterus, and fallopian tubes. The right ovary is small and normal in appearance. The left ovary is adhered to the left pelvic side wall by adhesions with some small endometriosis implants noted on the ovary.    Indications: Angie Hill is an 64 y.o. female who is having surgery for Family history of ovarian cancer [Z80.41]  Pelvic pain in female [R10.2].     The patient was seen in the preoperative area. The risks, benefits, complications, treatment options, non-operative alternatives, expected recovery and outcomes were discussed with the patient. The possibilities of reaction to medication, pulmonary aspiration, injury to surrounding structures, bleeding, recurrent infection, the need for additional procedures, failure to diagnose a condition, and creating a complication requiring transfusion or operation were discussed with the patient. The patient concurred with the proposed plan, giving informed consent.  The site of surgery was properly noted/marked if necessary per policy. The patient has been actively warmed in preoperative area. Preoperative antibiotics have been ordered and given within 1 hours of incision. Venous thrombosis prophylaxis have been ordered including bilateral sequential compression devices    Patient was brought to the operating room where general anesthesia was obtained. She was placed in lithotomy  position, prepped and draped. Bonds catheter was placed. Weighted speculum was placed in the vagina. The anterior lip of the cervix was grasped with a single toothed tenaculum. The uterus was sounded to 8 cm. The cervix was progressively dilated. The ANNABEL uterine manipulator was placed without difficulty. The manipulator was placed in the uterus and balloons inflated.  Other instruments were removed. Gloves were changed.  Attention was turned to the abdomen. A small umbilical incision was made with the scalpel.   The Veress needle was  inserted through this incision and intra-abdominal placement confirmed with the hanging drop test. Insufflation was then performed. 5 mm trocar was then inserted through this incision under direct visualization using the Visiport trocar. A 12 mm trocar was inserted in the left lower quadrant under direct visualization through a stab incision. A 5 mm trocar was inserted in the right lower quadrant under direct visualization through a stab incision.   Survey of the abdomen and pelvis was performed. See above findings. The ureters were both visualized deep in the pelvis.    Attention was turned to the left. A plain was found retroperitoneal near the left IP which was skeletonized due to the significant amount of adhesions surrounding the ovary. Dr. Burgess assisted using the Ligasure to grasp the IP and cauterize/transect the pedicle with good hemostasis noted. The round ligament was then cuaterized and cut. The anterior and posterior leaves were then  and transected using the LigaSure to skeletonize the uterine vessels. The vesicovaginal peritoneum was elevated, transected, and the bladder displaced inferiorly. The left uterine vessels were than transected using the LigaSure device. This procedure was then repeated on the right with IP followed by the round ligament and uterine vessels.     The cervicovaginal junction was then transected circumferentially using the needlepoint cautery. The uterus was then withdrawn into the vagina and left in place to maintain pneumoperitoneum. The pelvis was irrigated and hemostasis obtained along the cuff using minimal cautery.     An additional 5 mm trocar was placed in the left lower quadrant through a stab incision and under direct visualization. Using the V-Lock suture the vaginal cuff was closed in a running fashion. All pedicles were again inspected for hemostasis. Bilateral ureters were again noted to be peristalsing. The pelvis was irrigated. Insufflation was  released. The trocars were removed. The skin incisions were closed with 4-0 Vicryl in subcuticular fashion. Specimen was removed from the vagina and Bonds catheter removed. The patient was brought to the recovery room awake and in good condition. Sponge, needle and instrument counts were correct per nursing.     Dr. Burgess assisted the case due to the complexity and need for skilled assistance. There was no available resident for the case.     She inserted the initial three abdominal laparoscopic ports, controlled the camera during the procedure, and performed the transection of the right mesosalpinx, round ligament, broad ligament and right uterine artery.        Complications:  None; patient tolerated the procedure well.    Disposition: PACU - hemodynamically stable.  Condition: stable             Jessee Duque  Phone Number: [phone]

## 2025-02-04 NOTE — OP NOTE
TOTAL HYSTERECTOMY, LAPAROSCOPIC, WITH BILATERAL SALPINGO-OOPHORECTOMY, CYSTO (DR HARRISON TO ASSIST) (B) Operative Note     Date: 2025  OR Location: [facility]    Name: Angie Hill, : 1960, Age: 64 y.o., MRN: [id number], Sex: female    Diagnosis  Pre-op Diagnosis      * Family history of ovarian cancer [Z80.41]     * Pelvic pain in female [R10.2] Post-op Diagnosis     * Family history of ovarian cancer [Z80.41]     * Pelvic pain in female [R10.2]     Procedures  TOTAL HYSTERECTOMY, LAPAROSCOPIC, WITH BILATERAL SALPINGO-OOPHORECTOMY, CYSTO (DR HARRISON TO ASSIST)  [id number] - ID LAPS TOTAL HYSTERECT 250 GM/< W/RMVL TUBE/OVARY      Surgeons      * Jessee Duque - Primary    Resident/Fellow/Other Assistant:  Surgeons and Role:     * Radha Harrison MD - Assisting    Staff:   Circulator: Mireya Daileyub Person: Harmony Naylor Person: Hanna    Anesthesia Staff: CRNA: KEYUR Gerardo-CRNA    Procedure Summary  Anesthesia: General  ASA: II  Estimated Blood Loss: 20 mL  Intra-op Medications:   Administrations occurring from 0705 to 0915 on 25:   Medication Name Total Dose   ceFAZolin (Ancef) vial 1 g 2 g   dexAMETHasone (Decadron) injection 4 mg/mL 4 mg   fentaNYL (Sublimaze) injection 50 mcg/mL 100 mcg   ketorolac (Toradol) 30 mg 30 mg   lidocaine (Xylocaine) injection 2 % 50 mg   midazolam (Versed) injection 1 mg/mL 2 mg   ondansetron (Zofran) 2 mg/mL injection 4 mg   propofol (Diprivan) injection 10 mg/mL 150 mg   rocuronium (ZeMuron) 50 mg/5 mL injection 50 mg              Anesthesia Record               Intraprocedure I/O Totals       None           Specimen:   ID Type Source Tests Collected by Time   1 : UTERUS, CERVIX, BILATERAL FALLOPIAN TUBES, BILATERAL OVARIES Tissue UTERUS, CERVIX, FALLOPIAN TUBES AND OVARIES BILATERAL SURGICAL PATHOLOGY EXAM Jessee Duque MD 2025 [id number]                 Drains and/or Catheters:   Urethral Catheter Non-latex 16 Fr. (Active)       Tourniquet Times:          Implants:     Findings: Normal appearing cervix, uterus, and fallopian tubes. The right ovary is small and normal in appearance. The left ovary is adhered to the left pelvic side wall by adhesions with some small endometriosis implants noted on the ovary.    Indications: Angie Hill is an 64 y.o. female who is having surgery for Family history of ovarian cancer [Z80.41]  Pelvic pain in female [R10.2].     The patient was seen in the preoperative area. The risks, benefits, complications, treatment options, non-operative alternatives, expected recovery and outcomes were discussed with the patient. The possibilities of reaction to medication, pulmonary aspiration, injury to surrounding structures, bleeding, recurrent infection, the need for additional procedures, failure to diagnose a condition, and creating a complication requiring transfusion or operation were discussed with the patient. The patient concurred with the proposed plan, giving informed consent.  The site of surgery was properly noted/marked if necessary per policy. The patient has been actively warmed in preoperative area. Preoperative antibiotics have been ordered and given within 1 hours of incision. Venous thrombosis prophylaxis have been ordered including bilateral sequential compression devices    Patient was brought to the operating room where general anesthesia was obtained. She was placed in lithotomy  position, prepped and draped. Bonds catheter was placed. Weighted speculum was placed in the vagina. The anterior lip of the cervix was grasped with a single toothed tenaculum. The uterus was sounded to 8 cm. The cervix was progressively dilated. The ANNABEL uterine manipulator was placed without difficulty. The manipulator was placed in the uterus and balloons inflated.  Other instruments were removed. Gloves were changed.  Attention was turned to the abdomen. A small umbilical incision was made with the scalpel.   The Veress needle was  inserted through this incision and intra-abdominal placement confirmed with the hanging drop test. Insufflation was then performed. 5 mm trocar was then inserted through this incision under direct visualization using the Visiport trocar. A 12 mm trocar was inserted in the left lower quadrant under direct visualization through a stab incision. A 5 mm trocar was inserted in the right lower quadrant under direct visualization through a stab incision.   Survey of the abdomen and pelvis was performed. See above findings. The ureters were both visualized deep in the pelvis.    Attention was turned to the left. A plain was found retroperitoneal near the left IP which was skeletonized due to the significant amount of adhesions surrounding the ovary. Dr. Burgess assisted using the Ligasure to grasp the IP and cauterize/transect the pedicle with good hemostasis noted. The round ligament was then cuaterized and cut. The anterior and posterior leaves were then  and transected using the LigaSure to skeletonize the uterine vessels. The vesicovaginal peritoneum was elevated, transected, and the bladder displaced inferiorly. The left uterine vessels were than transected using the LigaSure device. This procedure was then repeated on the right with IP followed by the round ligament and uterine vessels.     The cervicovaginal junction was then transected circumferentially using the needlepoint cautery. The uterus was then withdrawn into the vagina and left in place to maintain pneumoperitoneum. The pelvis was irrigated and hemostasis obtained along the cuff using minimal cautery.     An additional 5 mm trocar was placed in the left lower quadrant through a stab incision and under direct visualization. Using the V-Lock suture the vaginal cuff was closed in a running fashion. All pedicles were again inspected for hemostasis. Bilateral ureters were again noted to be peristalsing. The pelvis was irrigated. Insufflation was  released. The trocars were removed. The skin incisions were closed with 4-0 Vicryl in subcuticular fashion. Specimen was removed from the vagina and Bonds catheter removed. The patient was brought to the recovery room awake and in good condition. Sponge, needle and instrument counts were correct per nursing.     A cystourethroscopy was performed     Dr. Burgess assisted the case due to the complexity and need for skilled assistance. There was no available resident for the case.     She inserted the initial three abdominal laparoscopic ports, controlled the camera during the procedure, and performed the transection of the right mesosalpinx, round ligament, broad ligament and right uterine artery.        Complications:  None; patient tolerated the procedure well.    Disposition: PACU - hemodynamically stable.  Condition: stable             Jessee Duque  Phone Number: [phone]

## 2025-02-07 LAB
LABORATORY COMMENT REPORT: NORMAL
PATH REPORT.FINAL DX SPEC: NORMAL
PATH REPORT.GROSS SPEC: NORMAL
PATH REPORT.RELEVANT HX SPEC: NORMAL
PATH REPORT.TOTAL CANCER: NORMAL

## 2025-02-19 ENCOUNTER — APPOINTMENT (OUTPATIENT)
Dept: OBSTETRICS AND GYNECOLOGY | Facility: CLINIC | Age: 65
End: 2025-02-19
Payer: MEDICARE

## 2025-02-19 VITALS — SYSTOLIC BLOOD PRESSURE: 100 MMHG | BODY MASS INDEX: 28.79 KG/M2 | WEIGHT: 157.4 LBS | DIASTOLIC BLOOD PRESSURE: 80 MMHG

## 2025-02-19 DIAGNOSIS — Z48.89 POSTOPERATIVE VISIT: Primary | ICD-10-CM

## 2025-02-19 RX ORDER — ACETAMINOPHEN 500 MG
TABLET ORAL EVERY 6 HOURS PRN
COMMUNITY

## 2025-02-19 NOTE — PROGRESS NOTES
Subjective   Patient ID: Angie Hill is a 64 y.o. female who presents for Post-op (2-4-25 TLH with BSO/No concerns noted ).  Patient is doing very well. S/p TLH, BSO. Minimal vaginal bleeding. Incisions are healing well with minimal pain. No fevers, chills. No HA/vision changes. No RUQ pain, n/v. No chest pain or SOB. No calf pain.          Review of Systems   All other systems reviewed and are negative.      Objective   Physical Exam  General: A&Ox3  Head: Normocephalic, atraumatic  Heart/Lungs: RRR, No murmurs, gallops, or rubs. Lungs are CTAB.  Abdomen: Soft, nontender. BS+4. No bruising or masses.  Genitourinary: Labia and vagina normal in appearance.  Vaginal cuff is intact.  No adnexal masses palpated.  Lower Extremities: No lower extremity Edema no palpable cords.     A chaperone, Melia Rawls, was present for the exam.    Assessment/Plan   Problem List Items Addressed This Visit       Postoperative visit - Primary    Overview     Pathology is benign. Patient is doing very well. No concerns noted today. Follow up annually or sooner prn.                   Jessee Duque MD 02/19/25 1:36 PM

## 2025-05-08 DIAGNOSIS — Z12.39 ENCOUNTER FOR BREAST CANCER SCREENING USING NON-MAMMOGRAM MODALITY: Primary | ICD-10-CM

## 2025-06-04 ENCOUNTER — HOSPITAL ENCOUNTER (OUTPATIENT)
Dept: RADIOLOGY | Facility: CLINIC | Age: 65
Discharge: HOME | End: 2025-06-04
Payer: MEDICARE

## 2025-06-04 ENCOUNTER — APPOINTMENT (OUTPATIENT)
Dept: ORTHOPEDIC SURGERY | Facility: CLINIC | Age: 65
End: 2025-06-04
Payer: MEDICARE

## 2025-06-04 DIAGNOSIS — M17.12 PRIMARY OSTEOARTHRITIS OF LEFT KNEE: ICD-10-CM

## 2025-06-04 PROCEDURE — 73564 X-RAY EXAM KNEE 4 OR MORE: CPT | Mod: LEFT SIDE | Performed by: RADIOLOGY

## 2025-06-04 PROCEDURE — 73564 X-RAY EXAM KNEE 4 OR MORE: CPT | Mod: LT

## 2025-06-04 PROCEDURE — 1159F MED LIST DOCD IN RCRD: CPT | Performed by: FAMILY MEDICINE

## 2025-06-04 PROCEDURE — 1036F TOBACCO NON-USER: CPT | Performed by: FAMILY MEDICINE

## 2025-06-04 PROCEDURE — 20610 DRAIN/INJ JOINT/BURSA W/O US: CPT | Performed by: FAMILY MEDICINE

## 2025-06-04 PROCEDURE — 99214 OFFICE O/P EST MOD 30 MIN: CPT | Performed by: FAMILY MEDICINE

## 2025-06-04 RX ORDER — TRIAMCINOLONE ACETONIDE 40 MG/ML
40 INJECTION, SUSPENSION INTRA-ARTICULAR; INTRAMUSCULAR
Status: COMPLETED | OUTPATIENT
Start: 2025-06-04 | End: 2025-06-04

## 2025-06-04 RX ORDER — LIDOCAINE HYDROCHLORIDE 10 MG/ML
4 INJECTION, SOLUTION INFILTRATION; PERINEURAL
Status: COMPLETED | OUTPATIENT
Start: 2025-06-04 | End: 2025-06-04

## 2025-06-04 RX ADMIN — LIDOCAINE HYDROCHLORIDE 4 ML: 10 INJECTION, SOLUTION INFILTRATION; PERINEURAL at 08:24

## 2025-06-04 RX ADMIN — TRIAMCINOLONE ACETONIDE 40 MG: 40 INJECTION, SUSPENSION INTRA-ARTICULAR; INTRAMUSCULAR at 08:24

## 2025-06-04 NOTE — PROGRESS NOTES
** Please excuse any errors in grammar or translation related to this dictation. Voice recognition software was utilized to prepare this document. **    Assessment & Plan:  Patient here for ongoing management of left knee arthritis. Xrays obtained today demonstrate progression of disease. Given previous positive response to steroid injection, offered to repeat today which patient was agreeable to have completed. We did discussed difference between CSI and JASMYN.   Non-operative treatment options reviewed below.  - Maintaining a healthy weight: Every pound of bodyweight is about 4-5 pounds through the lower extremity.   - Exercise program to strengthen supportive musculature.    - Prescription and otc analgesics to include but not limited to APAP, ibuprofen, naproxen, diclofenac gel.  Meloxicam last prescribed 9/2024.  - Steroid injection.   - Hyaluronic acid injection. Synvisc-One in October 2023 helped immensely; can repeat JASMYN in future at patient's request.   - Given severity of knee OA, offered referral for arthroplasty.  Declines need at this time and hopes to delay 2-3 years.   Informed patient that steroid injection can be repeated every 3 or more months as symptoms dictate.  All questions answered and patient is agreeable to this plan.       Chief complaint: left knee pain    HPI:  6/4/25:  Patient follows up for left knee pain.  She reports the cortisone injection from 9/4/24 provided relief up until the past few weeks.  Denies any injuries. She has had good relief with Synvisc-One in the past and is interested in repeating that.    9/4/24: Patient reports the injection on 4/3 provided about 5 months relief.  It is intermittently achy.  She is planning on doing a lot of hiking soon. Meloxicam as needed gives her relief.     4/3/24: Patient reports Synvisc-One injection from 10/26 is still providing her with some relief.  She is starting to have occasional achiness in the left knee and would like to supplement  it with a cortisone injection.  Denies any new injuries.     10/26/23: Patient presents for left knee Synvisc-One injection.  Reports she responded well to steroid injection is having minimal symptoms at this time.      8/31/23: 63-year-old female history of left knee arthritis referred by Dr. Kamara for hyaluronic acid injection consult.  Patient reports ongoing left knee pain for a little over 1 year. She reports previous treatment with steroid injection followed by hyaluronic acid injection. The Synvisc-1 injection was completed in October 2022. Reports this gave her approximately 5 to 6 months of pain relief. She recently follow-up with Dr. Kamara to discuss ongoing treatments of this condition. She was informed that due to the underlying arthritis in her knee that she would not be a candidate for arthroscopic surgery. If wanted to pursue surgical options it would be for total joint replacement. She feels that her symptoms are not severe and like to continue nonoperative management at this time.     Problem List[1]  Medical History[2]  Surgical History[3]  Medications Ordered Prior to Encounter[4]    Exam:  Left knee examined. No effusion, ecchymosis, or erythema. AROM from 0 to 130 deg with 5/5 strength. Mild medial joint line tenderness. No ttp with patellar compression.  No laxity with valgus or varus stress.     General Exam:  Constitutional - NAD, AAO x 3, conversing appropriately.  HEENT- Normocephalic and atraumatic. EOMI, PERRLA, No scleral icterus. No facial deformities. Hearing grossly normal.  Lungs - Breathing non-labored with normal rate. No accessory muscle use.  CV - Extremities warm and well-perfused, brisk capillary refill present.   Neuro - CN II-XII grossly intact.    Results:  X-rays of the left knee obtained 6/4/2025 personally interpreted as advanced medial compartment degenerative changes with joint space narrowing, subchondral sclerosis and osteophyte formation.  Progression of disease when  compared to previous x-rays in 2022.    MRI of left knee obtained 8/18/2022 : moderate medial compartment degenerative changes.      Lab Results   Component Value Date    HGBA1C 5.7 (H) 07/16/2024    CREATININE 0.83 02/04/2025    EGFR 78 02/04/2025     Procedure:  Patient ID: Angie Hill is a 64 y.o. female.    L Inj/Asp: L knee on 6/4/2025 8:24 AM  Indications: pain  Details: 25 G needle, anterolateral approach  Medications: 40 mg triamcinolone acetonide 40 mg/mL; 4 mL lidocaine 10 mg/mL (1 %)  Outcome: tolerated well, no immediate complications    Procedure risk factors to include increased pain, bleeding, infection, neurovascular injury, soft tissue injury, progressive cartilage loss, transient elevation of blood glucose and blood pressure, and adverse reaction to medication were discussed with the patient. Patient understands there is a moderate risk of morbidity from undergoing the procedure.    Procedure, treatment alternatives, risks and benefits explained, specific risks discussed. Consent was given by the patient. Immediately prior to procedure a time out was called to verify the correct patient, procedure, equipment, support staff and site/side marked as required. Patient was prepped and draped in the usual sterile fashion.              [1]   Patient Active Problem List  Diagnosis    Mata esophagus    Dysphagia    Essential hypertension    Gastroesophageal reflux disease    IFG (impaired fasting glucose)    Irritable bowel syndrome    Other nail disorders    Genu varum, acquired, left    Left flank pain    Acute non-recurrent pansinusitis    Anxiety    Mixed hyperlipidemia    Diverticulitis    Hyperglycemia    Insulin resistance    Pelvic pain in female    Family history of ovarian cancer    Postoperative visit   [2]   Past Medical History:  Diagnosis Date    Allergic 1970    Anxiety 2000    Arthritis 2021    knees    Basal cell carcinoma 2023    BRCA1 negative 2017    BRCA2 negative 2017     Breast cyst 1982    Diverticulosis 2014    Endometriosis     Female infertility     Fibrocystic breast 2000    GERD (gastroesophageal reflux disease)     Hypertension 2000    Inflammatory bowel disease     Irritable bowel syndrome     Metabolic disease     pre diabetic A1c    Personal history of other mental and behavioral disorders 2022    History of anxiety    Skin cancer (melanoma) (Multi) 2023    Skin disorder     lesion skin: basal cell; malignant melanoma stage 1   [3]   Past Surgical History:  Procedure Laterality Date    BILATERAL SALPINGOOPHORECTOMY Bilateral 2025    Dr Duque    BREAST BIOPSY  1982 aspiration of cyst    BREAST CYST ASPIRATION Right 1982    Benign     SECTION, LOW TRANSVERSE  1992    COLONOSCOPY  2019    EXPLORATORY LAPAROTOMY  1992    LAPAROSCOPIC HYSTERECTOMY N/A 2025    Dr Duque    LAPAROSCOPY DIAGNOSTIC / BIOPSY / ASPIRATION / LYSIS  2016    Exploratory Laparoscopy    LASIK      MENISCECTOMY      right knee    OTHER SURGICAL HISTORY  10/12/2021    Knee surgery    SKIN BIOPSY      SKIN CANCER EXCISION      TONSILLECTOMY  1964   [4]   Current Outpatient Medications on File Prior to Visit   Medication Sig Dispense Refill    acetaminophen (Tylenol) 500 mg tablet Take by mouth every 6 hours if needed for mild pain (1 - 3).      amLODIPine (Norvasc) 5 mg tablet Take 1 tablet (5 mg) by mouth once daily. 90 tablet 3    co-enzyme Q-10 30 mg capsule Take 1 capsule (30 mg) by mouth once daily.      ibuprofen 600 mg tablet Take 1 tablet (600 mg) by mouth every 6 hours if needed for mild pain (1 - 3) (pain). (Patient not taking: Reported on 2025) 30 tablet 1    losartan (Cozaar) 100 mg tablet Take 1 tablet (100 mg) by mouth once daily. 90 tablet 1    meloxicam (Mobic) 15 mg tablet Take 1 tablet (15 mg) by mouth once daily as needed (for knee pain). 90 tablet 0    multivitamin tablet TAKE 1 TABLET DAILY.            OTC      ondansetron ODT (Zofran-ODT) 4 mg disintegrating tablet Dissolve 1 tablet (4 mg) in the mouth every 8 hours if needed for nausea or vomiting. (Patient not taking: Reported on 2/19/2025) 40 tablet 1    pantoprazole (ProtoNix) 40 mg EC tablet Take 1 tablet (40 mg) by mouth once daily in the morning. Take before meals. Do not crush, chew, or split. 90 tablet 3    sennosides-docusate sodium (Soco-Colace) 8.6-50 mg tablet Take 1 tablet by mouth as needed at bedtime for constipation. (Patient not taking: Reported on 2/19/2025) 14 tablet 1    simethicone (Mylicon) 80 mg chewable tablet Chew 1 tablet (80 mg) every 6 hours if needed for flatulence. (Patient not taking: Reported on 2/19/2025) 30 tablet 1     No current facility-administered medications on file prior to visit.

## 2025-06-06 ENCOUNTER — HOSPITAL ENCOUNTER (OUTPATIENT)
Dept: RADIOLOGY | Facility: CLINIC | Age: 65
Discharge: HOME | End: 2025-06-06

## 2025-06-06 DIAGNOSIS — Z12.39 ENCOUNTER FOR BREAST CANCER SCREENING USING NON-MAMMOGRAM MODALITY: ICD-10-CM

## 2025-06-06 PROCEDURE — A9575 INJ GADOTERATE MEGLUMI 0.1ML: HCPCS | Performed by: NURSE PRACTITIONER

## 2025-06-06 PROCEDURE — 2550000001 HC RX 255 CONTRASTS: Performed by: NURSE PRACTITIONER

## 2025-06-06 PROCEDURE — 6100000003 BI MR BREAST BILATERAL WITH CONTRAST FAST SCREENING SELF PAY

## 2025-06-06 RX ORDER — GADOTERATE MEGLUMINE 376.9 MG/ML
15 INJECTION INTRAVENOUS
Status: COMPLETED | OUTPATIENT
Start: 2025-06-06 | End: 2025-06-06

## 2025-06-06 RX ADMIN — GADOTERATE MEGLUMINE 15 ML: 376.9 INJECTION INTRAVENOUS at 12:18

## 2025-07-09 ENCOUNTER — APPOINTMENT (OUTPATIENT)
Dept: DERMATOLOGY | Facility: CLINIC | Age: 65
End: 2025-07-09
Payer: MEDICARE

## 2025-07-09 DIAGNOSIS — L82.1 SEBORRHEIC KERATOSIS: ICD-10-CM

## 2025-07-09 DIAGNOSIS — Z85.828 PERSONAL HISTORY OF OTHER MALIGNANT NEOPLASM OF SKIN: ICD-10-CM

## 2025-07-09 DIAGNOSIS — Z12.83 ENCOUNTER FOR SCREENING FOR MALIGNANT NEOPLASM OF SKIN: ICD-10-CM

## 2025-07-09 DIAGNOSIS — D23.9 DERMATOFIBROMA: ICD-10-CM

## 2025-07-09 DIAGNOSIS — L81.4 LENTIGO: ICD-10-CM

## 2025-07-09 DIAGNOSIS — D18.01 HEMANGIOMA OF SKIN: ICD-10-CM

## 2025-07-09 DIAGNOSIS — D22.71 MELANOCYTIC NEVI OF RIGHT LOWER LIMB, INCLUDING HIP: ICD-10-CM

## 2025-07-09 DIAGNOSIS — L57.8 PHOTOAGING OF SKIN: Primary | ICD-10-CM

## 2025-07-09 DIAGNOSIS — L57.0 ACTINIC KERATOSIS: ICD-10-CM

## 2025-07-09 DIAGNOSIS — Z85.820 PERSONAL HISTORY OF MALIGNANT MELANOMA OF SKIN: ICD-10-CM

## 2025-07-09 PROCEDURE — 99213 OFFICE O/P EST LOW 20 MIN: CPT | Performed by: DERMATOLOGY

## 2025-07-09 PROCEDURE — 1159F MED LIST DOCD IN RCRD: CPT | Performed by: DERMATOLOGY

## 2025-07-09 PROCEDURE — 17000 DESTRUCT PREMALG LESION: CPT | Performed by: DERMATOLOGY

## 2025-07-09 NOTE — Clinical Note
There is no evidence of recurrence on clinical examination today, reassurance was provided to the patient. The importance of sun protection was reviewed with the patient including the use of a broad spectrum sunscreen that protects against both UVA/UVB rays, with ingredients such as Zinc oxide or titanium dioxide, wearing sun protective clothing and sun avoidance. Warning signs of non-melanoma skin cancer were reviewed. ABCDEs of melanoma reviewed. Patient to f/u should they notice any new or changing pre-existing skin lesion    If symptomatic or becomes more hypertrophic can consider intralesional kenalog

## 2025-07-09 NOTE — PROGRESS NOTES
Subjective     Angie Hill is a 65 y.o. female who presents for the following: Skin Check (FBSE 6 month. Lesion of concern left forearm. Hx of Melanoma Right upper extremity. BCC of trunk. Actinic Keratosis.).          Review of Systems:  No other skin or systemic complaints other than what is documented elsewhere in the note.    The following portions of the chart were reviewed this encounter and updated as appropriate:         Skin Cancer History  Biopsy Log Book  Biopsied Type Location Status   12/20/23 BCC, Superficial Right Upper Back Treatment Complete  2/8/24 12/20/23 Other Malignant Neoplasm Right superior shoulder Treatment Complete  2/8/24 2/7/24 Melanoma Right superior shoulder Treatment Complete  2/9/24       Additional History  Melanoma, [place] 0.1mm - right superior shoulder s/p [facility]      Specialty Problems          Dermatology Problems    Other nail disorders        Objective   Well appearing patient in no apparent distress; mood and affect are within normal limits.    A full examination was performed including scalp, head, eyes, ears, nose, lips, neck, chest, axillae, abdomen, back, buttocks, bilateral upper extremities, bilateral lower extremities, hands, feet, fingers, toes, fingernails, and toenails. All findings within normal limits unless otherwise noted below.    Assessment/Plan   Skin Exam  1. PHOTOAGING OF SKIN  Generalized  Mottled pigmentation with telangiectasias and brown reticular macules in sun exposed areas of the body.   The risk of chronic, cumulative sun damage and risk of development of skin cancer was reviewed today.   The importance of sun protection was reviewed: including the use of a broad spectrum sunscreen that protects against both UVA/UVB rays, with ingredients such as Zinc oxide or titanium dioxide, wearing sun protective clothing and sun avoidance. We reviewed the warning signs of non-melanoma skin cancer and ABCDEs of melanoma  Please follow up should you  notice any new or changing pre-existing skin lesion.  Related Procedures  Follow Up In Dermatology - Established Patient  2. ACTINIC KERATOSIS  Left Forearm - Posterior  Erythematous macules with gritty scale.  Lesions are due to cumulative sun damage over time and are pre-cancerous. They have a risk (although small in majority of patients) of developing into squamous cell carcinoma and therefore treatment recommendations were offered and discussed.   Treatments Discussed include LN2, photodynamic (blue light) therapy & topical chemotherapy creams, risks and benefits of each.     The risks and benefits of LN2 were reviewed including incomplete removal, crusting, blister hypo and/or hyperpigmentation, scarring and recurrence. Pt elected for LN2 today  Destr of lesion - Left Forearm - Posterior  Complexity: simple    Destruction method: cryotherapy    Informed consent: discussed and consent obtained    Lesion destroyed using liquid nitrogen: Yes    Region frozen until ice ball extended beyond lesion: Yes    Cryotherapy cycles:  1  Outcome: patient tolerated procedure well with no complications    Post-procedure details: wound care instructions given    3. PERSONAL HISTORY OF MALIGNANT MELANOMA OF SKIN  Right Shoulder - Anterior  Well healed scar at site of prior treatment without evidence of recurrence. No lymphadenopathy of bilateral axilla.  There is no evidence of recurrence or repigmentation on clinical examination today, reassure was provided to the patient. The importance of sun protection was reviewed with the patient including the use of a broad spectrum sunscreen that protects against both UVA/UVB rays, with ingredients such as Zinc oxide or titanium dioxide, wearing sun protective clothing and sun avoidance. ABCDEs of melanoma reviewed. Patient to f/u should they notice any new or changing pre-existing skin lesion. The patient was advised to follow up 6 months for FBSE due to history of melanoma.  4.  PERSONAL HISTORY OF OTHER MALIGNANT NEOPLASM OF SKIN  Right Upper Back  Well healed scar that is slightly hypertrophic at site of prior treatment without evidence of recurrence.  There is no evidence of recurrence on clinical examination today, reassurance was provided to the patient. The importance of sun protection was reviewed with the patient including the use of a broad spectrum sunscreen that protects against both UVA/UVB rays, with ingredients such as Zinc oxide or titanium dioxide, wearing sun protective clothing and sun avoidance. Warning signs of non-melanoma skin cancer were reviewed. ABCDEs of melanoma reviewed. Patient to f/u should they notice any new or changing pre-existing skin lesion    If symptomatic or becomes more hypertrophic can consider intralesional kenalog  5. LENTIGO  Generalized  Scattered tan macules in sun-exposed areas.  These are benign skin lesions due to sun exposure. They will darken in response to sun exposure. They should be monitored for change in size, shape or color.  These lesions can be treated cosmetically with topical creams, liquid nitrogen and a variety of lasers.  6. HEMANGIOMA OF SKIN  Generalized  Cherry red papules  The benign nature of these skin lesions were reviewed, no treatment is necessary.   Please follow up for any new or pre-existing lesion that is changing in size, shape, color, becomes painful, tender, itches or bleed.  7. SEBORRHEIC KERATOSIS (2)  Generalized, Right Upper Back  Brown, tan waxy macules and stuck on appearing papules and plaques  The benign nature of these skin lesions reviewed, reassure provided and no further treatment needed at this time.   These lesions can be removed, if symptomatic (itching, bleeding, rubbing on clothing, painful), otherwise removal is considered cosmetic.   8. DERMATOFIBROMA  Right Lower Leg - Anterior  Firm pink to slightly red papule with hyperpigmented halo, +dimple sign  The benign nature of these skin lesions  were reviewed, no treatment is necessary.   Please follow up for any new or pre-existing lesion that is changing in size, shape, color, becomes painful, tender, itches or bleed.    9. MELANOCYTIC NEVI OF RIGHT LOWER LIMB, INCLUDING HIP  Right Popliteal Fossa  3.5mm symmetric brown macule  Clinically benign appearing nevi, no treatment is necessary.  The importance of sun protection was reviewed: including the use of a broad spectrum sunscreen that protects against both UVA/UVB rays, with ingredients such as Zinc oxide or titanium dioxide, wearing sun protective clothing and sun avoidance.   ABCDEs of melanoma reviewed.  Please follow up should you notice any new or changing pre-existing skin lesion.  10. ENCOUNTER FOR SCREENING FOR MALIGNANT NEOPLASM OF SKIN  Generalized  No suspicious lesions noted on examination today.  No cervical or axillary lymphadenopathy bilaterally.  The risk of chronic, cumulative sun damage and risk of development of skin cancer was reviewed today.   The importance of sun protection was reviewed: including the use of a broad spectrum sunscreen that protects against both UVA/UVB rays, with ingredients such as Zinc oxide or titanium dioxide, wearing sun protective clothing and sun avoidance. We reviewed the warning signs of non-melanoma skin cancer and ABCDEs of melanoma  Please follow up should you notice any new or changing pre-existing skin lesion.  Related Procedures  Follow Up In Dermatology - Established Patient    Follow up in 6 months for FBSE

## 2025-07-09 NOTE — Clinical Note
There is no evidence of recurrence or repigmentation on clinical examination today, reassure was provided to the patient. The importance of sun protection was reviewed with the patient including the use of a broad spectrum sunscreen that protects against both UVA/UVB rays, with ingredients such as Zinc oxide or titanium dioxide, wearing sun protective clothing and sun avoidance. ABCDEs of melanoma reviewed. Patient to f/u should they notice any new or changing pre-existing skin lesion. The patient was advised to follow up 6 months for FBSE due to history of melanoma.

## 2025-07-09 NOTE — Clinical Note
Well healed scar that is slightly hypertrophic at site of prior treatment without evidence of recurrence.

## 2025-07-09 NOTE — Clinical Note
No suspicious lesions noted on examination today.  No cervical or axillary lymphadenopathy bilaterally.

## 2025-07-09 NOTE — Clinical Note
Well healed scar at site of prior treatment without evidence of recurrence. No lymphadenopathy of bilateral axilla.

## 2025-07-18 LAB
ALBUMIN SERPL-MCNC: 4.9 G/DL (ref 3.6–5.1)
ALP SERPL-CCNC: 76 U/L (ref 37–153)
ALT SERPL-CCNC: 21 U/L (ref 6–29)
ANION GAP SERPL CALCULATED.4IONS-SCNC: 11 MMOL/L (CALC) (ref 7–17)
AST SERPL-CCNC: 24 U/L (ref 10–35)
BILIRUB SERPL-MCNC: 0.8 MG/DL (ref 0.2–1.2)
BUN SERPL-MCNC: 18 MG/DL (ref 7–25)
CALCIUM SERPL-MCNC: 10 MG/DL (ref 8.6–10.4)
CHLORIDE SERPL-SCNC: 105 MMOL/L (ref 98–110)
CHOLEST SERPL-MCNC: 204 MG/DL
CHOLEST/HDLC SERPL: 4.5 (CALC)
CO2 SERPL-SCNC: 26 MMOL/L (ref 20–32)
CREAT SERPL-MCNC: 0.97 MG/DL (ref 0.5–1.05)
EGFRCR SERPLBLD CKD-EPI 2021: 64 ML/MIN/1.73M2
ERYTHROCYTE [DISTWIDTH] IN BLOOD BY AUTOMATED COUNT: 14.6 % (ref 11–15)
EST. AVERAGE GLUCOSE BLD GHB EST-MCNC: 120 MG/DL
EST. AVERAGE GLUCOSE BLD GHB EST-SCNC: 6.6 MMOL/L
GLUCOSE SERPL-MCNC: 108 MG/DL (ref 65–99)
HBA1C MFR BLD: 5.8 %
HCT VFR BLD AUTO: 42.8 % (ref 35–45)
HDLC SERPL-MCNC: 45 MG/DL
HGB BLD-MCNC: 13.8 G/DL (ref 11.7–15.5)
LDLC SERPL CALC-MCNC: 134 MG/DL (CALC)
MCH RBC QN AUTO: 29.9 PG (ref 27–33)
MCHC RBC AUTO-ENTMCNC: 32.2 G/DL (ref 32–36)
MCV RBC AUTO: 92.6 FL (ref 80–100)
NONHDLC SERPL-MCNC: 159 MG/DL (CALC)
PLATELET # BLD AUTO: 179 THOUSAND/UL (ref 140–400)
PMV BLD REES-ECKER: 12.4 FL (ref 7.5–12.5)
POTASSIUM SERPL-SCNC: 5.2 MMOL/L (ref 3.5–5.3)
PROT SERPL-MCNC: 7.1 G/DL (ref 6.1–8.1)
RBC # BLD AUTO: 4.62 MILLION/UL (ref 3.8–5.1)
SODIUM SERPL-SCNC: 142 MMOL/L (ref 135–146)
TRIGL SERPL-MCNC: 139 MG/DL
WBC # BLD AUTO: 4.3 THOUSAND/UL (ref 3.8–10.8)

## 2025-07-20 PROBLEM — Z48.89 POSTOPERATIVE VISIT: Status: RESOLVED | Noted: 2025-02-19 | Resolved: 2025-07-20

## 2025-07-20 NOTE — ASSESSMENT & PLAN NOTE
Managed with amlodipine 5mg daily  Managed with losartan 100mg daily  Orders:    amLODIPine (Norvasc) 5 mg tablet; Take 1 tablet (5 mg) by mouth once daily.    losartan (Cozaar) 100 mg tablet; Take 1 tablet (100 mg) by mouth once daily.

## 2025-07-20 NOTE — ASSESSMENT & PLAN NOTE
Managed with pantoprazole 40mg daily  Orders:    pantoprazole (ProtoNix) 40 mg EC tablet; Take 1 tablet (40 mg) by mouth once daily in the morning. Take before meals. Do not crush, chew, or split.

## 2025-07-20 NOTE — ASSESSMENT & PLAN NOTE
Some increased stress d/t sister recently passing  [facility]rs reviewed   Ativan last filled in January   Orders:    LORazepam (Ativan) 0.5 mg tablet; Take 1 tablet (0.5 mg) by mouth every 6 hours if needed for anxiety.

## 2025-07-21 ENCOUNTER — OFFICE VISIT (OUTPATIENT)
Dept: PRIMARY CARE | Facility: CLINIC | Age: 65
End: 2025-07-21
Payer: MEDICARE

## 2025-07-21 VITALS
SYSTOLIC BLOOD PRESSURE: 125 MMHG | DIASTOLIC BLOOD PRESSURE: 80 MMHG | BODY MASS INDEX: 27.6 KG/M2 | HEART RATE: 61 BPM | TEMPERATURE: 97.8 F | OXYGEN SATURATION: 98 % | HEIGHT: 62 IN | WEIGHT: 150 LBS

## 2025-07-21 DIAGNOSIS — I10 ESSENTIAL HYPERTENSION: ICD-10-CM

## 2025-07-21 DIAGNOSIS — F41.9 ANXIETY: ICD-10-CM

## 2025-07-21 DIAGNOSIS — Z00.00 ENCOUNTER FOR MEDICARE ANNUAL WELLNESS EXAM: Primary | ICD-10-CM

## 2025-07-21 DIAGNOSIS — R73.9 HYPERGLYCEMIA: ICD-10-CM

## 2025-07-21 DIAGNOSIS — K22.719 BARRETT'S ESOPHAGUS WITH DYSPLASIA: ICD-10-CM

## 2025-07-21 DIAGNOSIS — K21.9 GASTROESOPHAGEAL REFLUX DISEASE, UNSPECIFIED WHETHER ESOPHAGITIS PRESENT: ICD-10-CM

## 2025-07-21 DIAGNOSIS — E78.2 MIXED HYPERLIPIDEMIA: ICD-10-CM

## 2025-07-21 PROBLEM — R13.10 DYSPHAGIA: Status: RESOLVED | Noted: 2023-09-06 | Resolved: 2025-07-21

## 2025-07-21 PROBLEM — R73.01 IFG (IMPAIRED FASTING GLUCOSE): Status: RESOLVED | Noted: 2023-09-06 | Resolved: 2025-07-21

## 2025-07-21 PROBLEM — R10.9 LEFT FLANK PAIN: Status: RESOLVED | Noted: 2023-10-30 | Resolved: 2025-07-21

## 2025-07-21 PROBLEM — J01.40 ACUTE NON-RECURRENT PANSINUSITIS: Status: RESOLVED | Noted: 2023-11-28 | Resolved: 2025-07-21

## 2025-07-21 PROBLEM — R10.2 PELVIC PAIN IN FEMALE: Status: RESOLVED | Noted: 2025-01-15 | Resolved: 2025-07-21

## 2025-07-21 PROBLEM — K58.9 IRRITABLE BOWEL SYNDROME: Status: RESOLVED | Noted: 2023-09-06 | Resolved: 2025-07-21

## 2025-07-21 PROCEDURE — 3074F SYST BP LT 130 MM HG: CPT | Performed by: NURSE PRACTITIONER

## 2025-07-21 PROCEDURE — 99215 OFFICE O/P EST HI 40 MIN: CPT | Performed by: NURSE PRACTITIONER

## 2025-07-21 PROCEDURE — G0439 PPPS, SUBSEQ VISIT: HCPCS | Performed by: NURSE PRACTITIONER

## 2025-07-21 PROCEDURE — 1036F TOBACCO NON-USER: CPT | Performed by: NURSE PRACTITIONER

## 2025-07-21 PROCEDURE — 1159F MED LIST DOCD IN RCRD: CPT | Performed by: NURSE PRACTITIONER

## 2025-07-21 PROCEDURE — 1126F AMNT PAIN NOTED NONE PRSNT: CPT | Performed by: NURSE PRACTITIONER

## 2025-07-21 PROCEDURE — 3079F DIAST BP 80-89 MM HG: CPT | Performed by: NURSE PRACTITIONER

## 2025-07-21 PROCEDURE — 3008F BODY MASS INDEX DOCD: CPT | Performed by: NURSE PRACTITIONER

## 2025-07-21 RX ORDER — PANTOPRAZOLE SODIUM 40 MG/1
40 TABLET, DELAYED RELEASE ORAL
Qty: 90 TABLET | Refills: 3 | Status: SHIPPED | OUTPATIENT
Start: 2025-07-21 | End: 2026-07-21

## 2025-07-21 RX ORDER — AMLODIPINE BESYLATE 5 MG/1
5 TABLET ORAL DAILY
Qty: 90 TABLET | Refills: 3 | Status: SHIPPED | OUTPATIENT
Start: 2025-07-21 | End: 2026-07-21

## 2025-07-21 RX ORDER — LOSARTAN POTASSIUM 100 MG/1
100 TABLET ORAL DAILY
Qty: 90 TABLET | Refills: 1 | Status: SHIPPED | OUTPATIENT
Start: 2025-07-21 | End: 2026-01-17

## 2025-07-21 RX ORDER — LORAZEPAM 0.5 MG/1
0.5 TABLET ORAL EVERY 6 HOURS PRN
Qty: 30 TABLET | Refills: 1 | Status: SHIPPED | OUTPATIENT
Start: 2025-07-21

## 2025-07-21 RX ORDER — LORAZEPAM 0.5 MG/1
0.5 TABLET ORAL EVERY 6 HOURS PRN
COMMUNITY
End: 2025-07-21 | Stop reason: SDUPTHER

## 2025-07-21 ASSESSMENT — ENCOUNTER SYMPTOMS
ENDOCRINE NEGATIVE: 1
EYES NEGATIVE: 1
HEMATOLOGIC/LYMPHATIC NEGATIVE: 1
RESPIRATORY NEGATIVE: 1
NEUROLOGICAL NEGATIVE: 1
CONSTITUTIONAL NEGATIVE: 1
GASTROINTESTINAL NEGATIVE: 1
CARDIOVASCULAR NEGATIVE: 1
PSYCHIATRIC NEGATIVE: 1

## 2025-07-21 ASSESSMENT — PAIN SCALES - GENERAL: PAINLEVEL_OUTOF10: 0-NO PAIN

## 2025-09-30 ENCOUNTER — APPOINTMENT (OUTPATIENT)
Dept: ORTHOPEDIC SURGERY | Facility: CLINIC | Age: 65
End: 2025-09-30
Payer: MEDICARE

## 2026-01-07 ENCOUNTER — APPOINTMENT (OUTPATIENT)
Dept: DERMATOLOGY | Facility: CLINIC | Age: 66
End: 2026-01-07
Payer: MEDICARE

## (undated) DEVICE — SOLUTION, IRRIGATION, USP, SODIUM CHLORIDE 0.9%, .9 NACL, 1000 ML, BAG

## (undated) DEVICE — DEVICE, KOH-EFFICIENT, RUMI II, 3.5, LF

## (undated) DEVICE — IRRIGATION SET, CYSTOSCOPY, REGULATING CLAMP, STRAIGHT, 81 IN

## (undated) DEVICE — PREP TRAY, SKIN, DRY, W/GLOVES

## (undated) DEVICE — SUTURE, VICRYL, 4-0, 18 IN, UNDYED BR PS-2

## (undated) DEVICE — PREP, SCRUB, SKIN, FOAM, HIBICLENS, 4 OZ

## (undated) DEVICE — Device

## (undated) DEVICE — APPLICATOR, CHLORAPREP, W/ORANGE TINT, 26ML

## (undated) DEVICE — TRAY, SURESTEP, URINE METER, 16FR, COMPLETE, W/STATLOCK

## (undated) DEVICE — TROCAR, OPTICAL, BLADELESS, 12MM, THREADED, 100MM LENGTH

## (undated) DEVICE — DEVICE, FORCE TRIVERSE ELECTROSURGICAL

## (undated) DEVICE — SOLUTION, IRRIGATION, SODIUM CHLORIDE 0.9%, 1000 ML, POUR BOTTLE

## (undated) DEVICE — PAD, GROUNDING, ELECTROSURGICAL, W/9 FT CABLE, POLYHESIVE II, ADULT, LF

## (undated) DEVICE — TOWEL PACK, STERILE, 4/PACK, BLUE

## (undated) DEVICE — DRESSING, GAUZE, SPONGE, VERSALON, 4 PLY, 2 X 2 IN, STERILE

## (undated) DEVICE — COVER HANDLE LIGHT, STERIS, BLUE, STERILE

## (undated) DEVICE — DRAPE, LEGGINGS, 48 X 31 IN, STERILE, LF

## (undated) DEVICE — SYSTEM, SMOKE EVAC, SEECLEAR XCL, 8.0 LITER, LF

## (undated) DEVICE — TIP, RUMI BLUE 6.7MM X 8CM

## (undated) DEVICE — SPONGE, LAP, XRAY DECT, 4IN X 18IN, W/MASTER DMT, STERILE

## (undated) DEVICE — TROCAR, OPTICAL BLADELESS 5MM X 100 W/ADVANCED FIXATION

## (undated) DEVICE — SCISSOR TIP, ENDOCUT, LAPAROSCOPIC

## (undated) DEVICE — LIGASURE, L-HOOK 37CM SEALER/DIVIDER LAP, MARYLAND

## (undated) DEVICE — SYRINGE, 10 CC, LUER LOCK

## (undated) DEVICE — DRAPE, UNDERBUTTOCKS, W/ 27IN FLUID POUCH

## (undated) DEVICE — SUTURE, V-LOC, 0, 12IN, GS-21, GR 180 ABS